# Patient Record
Sex: MALE | Race: WHITE | Employment: UNEMPLOYED | URBAN - METROPOLITAN AREA
[De-identification: names, ages, dates, MRNs, and addresses within clinical notes are randomized per-mention and may not be internally consistent; named-entity substitution may affect disease eponyms.]

---

## 2017-05-13 ENCOUNTER — HOSPITAL ENCOUNTER (OUTPATIENT)
Age: 27
Setting detail: OBSERVATION
Discharge: HOME | DRG: 383 | End: 2017-05-13
Attending: EMERGENCY MEDICINE | Admitting: SURGERY
Payer: MEDICAID

## 2017-05-13 VITALS
RESPIRATION RATE: 16 BRPM | SYSTOLIC BLOOD PRESSURE: 132 MMHG | WEIGHT: 236.5 LBS | TEMPERATURE: 97.2 F | HEIGHT: 69 IN | OXYGEN SATURATION: 99 % | DIASTOLIC BLOOD PRESSURE: 77 MMHG | BODY MASS INDEX: 35.03 KG/M2 | HEART RATE: 60 BPM
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
## 2017-05-13 DIAGNOSIS — T23.271A: Primary | ICD-10-CM

## 2017-05-13 DIAGNOSIS — T30.0 BURN BY HOT LIQUID: ICD-10-CM

## 2017-05-13 LAB
ABSOLUTE EOS #: 0.2 K/UL (ref 0–0.4)
ABSOLUTE LYMPH #: 2.5 K/UL (ref 1–4.8)
ABSOLUTE MONO #: 0.6 K/UL (ref 0.1–1.2)
ANION GAP SERPL CALCULATED.3IONS-SCNC: 14 MMOL/L (ref 9–17)
BASOPHILS # BLD: 1 %
BASOPHILS ABSOLUTE: 0 K/UL (ref 0–0.2)
BUN BLDV-MCNC: 8 MG/DL (ref 6–20)
BUN/CREAT BLD: ABNORMAL (ref 9–20)
CALCIUM SERPL-MCNC: 8.3 MG/DL (ref 8.6–10.4)
CHLORIDE BLD-SCNC: 103 MMOL/L (ref 98–107)
CO2: 21 MMOL/L (ref 20–31)
CREAT SERPL-MCNC: 0.77 MG/DL (ref 0.7–1.2)
DIFFERENTIAL TYPE: ABNORMAL
EOSINOPHILS RELATIVE PERCENT: 2 %
GFR AFRICAN AMERICAN: >60 ML/MIN
GFR NON-AFRICAN AMERICAN: >60 ML/MIN
GFR SERPL CREATININE-BSD FRML MDRD: ABNORMAL ML/MIN/{1.73_M2}
GFR SERPL CREATININE-BSD FRML MDRD: ABNORMAL ML/MIN/{1.73_M2}
GLUCOSE BLD-MCNC: 122 MG/DL (ref 70–99)
HCT VFR BLD CALC: 41.7 % (ref 41–53)
HEMOGLOBIN: 14.4 G/DL (ref 13.5–17.5)
LYMPHOCYTES # BLD: 30 %
MCH RBC QN AUTO: 31.3 PG (ref 26–34)
MCHC RBC AUTO-ENTMCNC: 34.5 G/DL (ref 31–37)
MCV RBC AUTO: 90.6 FL (ref 80–100)
MONOCYTES # BLD: 8 %
PDW BLD-RTO: 13.3 % (ref 12.5–15.4)
PLATELET # BLD: 108 K/UL (ref 140–450)
PLATELET ESTIMATE: ABNORMAL
PMV BLD AUTO: 11.7 FL (ref 6–12)
POTASSIUM SERPL-SCNC: 3.9 MMOL/L (ref 3.7–5.3)
RBC # BLD: 4.6 M/UL (ref 4.5–5.9)
RBC # BLD: ABNORMAL 10*6/UL
SEG NEUTROPHILS: 59 %
SEGMENTED NEUTROPHILS ABSOLUTE COUNT: 4.9 K/UL (ref 1.8–7.7)
SODIUM BLD-SCNC: 138 MMOL/L (ref 135–144)
WBC # BLD: 8.2 K/UL (ref 3.5–11)
WBC # BLD: ABNORMAL 10*3/UL

## 2017-05-13 PROCEDURE — 2500000003 HC RX 250 WO HCPCS

## 2017-05-13 PROCEDURE — 6370000000 HC RX 637 (ALT 250 FOR IP): Performed by: STUDENT IN AN ORGANIZED HEALTH CARE EDUCATION/TRAINING PROGRAM

## 2017-05-13 PROCEDURE — 99284 EMERGENCY DEPT VISIT MOD MDM: CPT

## 2017-05-13 PROCEDURE — 96376 TX/PRO/DX INJ SAME DRUG ADON: CPT

## 2017-05-13 PROCEDURE — 97110 THERAPEUTIC EXERCISES: CPT

## 2017-05-13 PROCEDURE — 96374 THER/PROPH/DIAG INJ IV PUSH: CPT

## 2017-05-13 PROCEDURE — 36415 COLL VENOUS BLD VENIPUNCTURE: CPT

## 2017-05-13 PROCEDURE — 2500000003 HC RX 250 WO HCPCS: Performed by: STUDENT IN AN ORGANIZED HEALTH CARE EDUCATION/TRAINING PROGRAM

## 2017-05-13 PROCEDURE — 97535 SELF CARE MNGMENT TRAINING: CPT

## 2017-05-13 PROCEDURE — 85025 COMPLETE CBC W/AUTO DIFF WBC: CPT

## 2017-05-13 PROCEDURE — 6360000002 HC RX W HCPCS

## 2017-05-13 PROCEDURE — G8987 SELF CARE CURRENT STATUS: HCPCS

## 2017-05-13 PROCEDURE — G0378 HOSPITAL OBSERVATION PER HR: HCPCS

## 2017-05-13 PROCEDURE — G8989 SELF CARE D/C STATUS: HCPCS

## 2017-05-13 PROCEDURE — 80048 BASIC METABOLIC PNL TOTAL CA: CPT

## 2017-05-13 PROCEDURE — 6360000002 HC RX W HCPCS: Performed by: STUDENT IN AN ORGANIZED HEALTH CARE EDUCATION/TRAINING PROGRAM

## 2017-05-13 PROCEDURE — 96375 TX/PRO/DX INJ NEW DRUG ADDON: CPT

## 2017-05-13 PROCEDURE — G8988 SELF CARE GOAL STATUS: HCPCS

## 2017-05-13 PROCEDURE — 97166 OT EVAL MOD COMPLEX 45 MIN: CPT

## 2017-05-13 PROCEDURE — S0028 INJECTION, FAMOTIDINE, 20 MG: HCPCS | Performed by: STUDENT IN AN ORGANIZED HEALTH CARE EDUCATION/TRAINING PROGRAM

## 2017-05-13 RX ORDER — OXYCODONE HYDROCHLORIDE 5 MG/1
5 TABLET ORAL EVERY 4 HOURS PRN
Qty: 40 TABLET | Refills: 0 | Status: ON HOLD | OUTPATIENT
Start: 2017-05-13 | End: 2017-05-17

## 2017-05-13 RX ORDER — FLUOXETINE HYDROCHLORIDE 20 MG/1
20 CAPSULE ORAL DAILY
Status: DISCONTINUED | OUTPATIENT
Start: 2017-05-13 | End: 2017-05-13

## 2017-05-13 RX ORDER — SODIUM CHLORIDE 0.9 % (FLUSH) 0.9 %
10 SYRINGE (ML) INJECTION EVERY 12 HOURS SCHEDULED
Status: DISCONTINUED | OUTPATIENT
Start: 2017-05-13 | End: 2017-05-13 | Stop reason: HOSPADM

## 2017-05-13 RX ORDER — OXYCODONE HYDROCHLORIDE 5 MG/1
10 TABLET ORAL EVERY 4 HOURS PRN
Status: DISCONTINUED | OUTPATIENT
Start: 2017-05-13 | End: 2017-05-13 | Stop reason: HOSPADM

## 2017-05-13 RX ORDER — ONDANSETRON 2 MG/ML
4 INJECTION INTRAMUSCULAR; INTRAVENOUS EVERY 6 HOURS PRN
Status: DISCONTINUED | OUTPATIENT
Start: 2017-05-13 | End: 2017-05-13 | Stop reason: HOSPADM

## 2017-05-13 RX ORDER — FENTANYL CITRATE 50 UG/ML
INJECTION, SOLUTION INTRAMUSCULAR; INTRAVENOUS
Status: COMPLETED
Start: 2017-05-13 | End: 2017-05-13

## 2017-05-13 RX ORDER — FENTANYL CITRATE 50 UG/ML
50 INJECTION, SOLUTION INTRAMUSCULAR; INTRAVENOUS
Status: DISCONTINUED | OUTPATIENT
Start: 2017-05-13 | End: 2017-05-13 | Stop reason: HOSPADM

## 2017-05-13 RX ORDER — FENTANYL CITRATE 50 UG/ML
50 INJECTION, SOLUTION INTRAMUSCULAR; INTRAVENOUS ONCE
Status: DISCONTINUED | OUTPATIENT
Start: 2017-05-13 | End: 2017-05-13

## 2017-05-13 RX ORDER — SODIUM CHLORIDE 0.9 % (FLUSH) 0.9 %
10 SYRINGE (ML) INJECTION PRN
Status: DISCONTINUED | OUTPATIENT
Start: 2017-05-13 | End: 2017-05-13 | Stop reason: HOSPADM

## 2017-05-13 RX ORDER — OXYCODONE HYDROCHLORIDE 5 MG/1
5 TABLET ORAL EVERY 4 HOURS PRN
Status: DISCONTINUED | OUTPATIENT
Start: 2017-05-13 | End: 2017-05-13

## 2017-05-13 RX ADMIN — FENTANYL CITRATE 50 MCG: 50 INJECTION, SOLUTION INTRAMUSCULAR; INTRAVENOUS at 02:35

## 2017-05-13 RX ADMIN — FENTANYL CITRATE 50 MCG: 50 INJECTION, SOLUTION INTRAMUSCULAR; INTRAVENOUS at 03:04

## 2017-05-13 RX ADMIN — HYDROMORPHONE HYDROCHLORIDE 1 MG: 1 INJECTION, SOLUTION INTRAMUSCULAR; INTRAVENOUS; SUBCUTANEOUS at 05:49

## 2017-05-13 RX ADMIN — SILVER SULFADIAZINE: 10 CREAM TOPICAL at 02:30

## 2017-05-13 RX ADMIN — SILVER SULFADIAZINE: 10 CREAM TOPICAL at 12:26

## 2017-05-13 RX ADMIN — FAMOTIDINE 20 MG: 10 INJECTION, SOLUTION INTRAVENOUS at 09:07

## 2017-05-13 RX ADMIN — Medication 1 MG: at 01:31

## 2017-05-13 RX ADMIN — HYDROMORPHONE HYDROCHLORIDE 1 MG: 1 INJECTION, SOLUTION INTRAMUSCULAR; INTRAVENOUS; SUBCUTANEOUS at 01:31

## 2017-05-13 RX ADMIN — OXYCODONE HYDROCHLORIDE 5 MG: 5 TABLET ORAL at 03:30

## 2017-05-13 RX ADMIN — HYDROMORPHONE HYDROCHLORIDE 1 MG: 1 INJECTION, SOLUTION INTRAMUSCULAR; INTRAVENOUS; SUBCUTANEOUS at 09:59

## 2017-05-13 RX ADMIN — FAMOTIDINE 20 MG: 10 INJECTION, SOLUTION INTRAVENOUS at 02:58

## 2017-05-13 RX ADMIN — OXYCODONE HYDROCHLORIDE 5 MG: 5 TABLET ORAL at 12:25

## 2017-05-13 ASSESSMENT — PAIN DESCRIPTION - PAIN TYPE
TYPE: ACUTE PAIN

## 2017-05-13 ASSESSMENT — PAIN DESCRIPTION - ORIENTATION
ORIENTATION: RIGHT

## 2017-05-13 ASSESSMENT — PAIN DESCRIPTION - LOCATION
LOCATION: HAND

## 2017-05-13 ASSESSMENT — PAIN SCALES - GENERAL
PAINLEVEL_OUTOF10: 6
PAINLEVEL_OUTOF10: 8
PAINLEVEL_OUTOF10: 8
PAINLEVEL_OUTOF10: 6
PAINLEVEL_OUTOF10: 1
PAINLEVEL_OUTOF10: 6
PAINLEVEL_OUTOF10: 7
PAINLEVEL_OUTOF10: 6
PAINLEVEL_OUTOF10: 8
PAINLEVEL_OUTOF10: 6
PAINLEVEL_OUTOF10: 8
PAINLEVEL_OUTOF10: 6

## 2017-05-13 ASSESSMENT — PAIN DESCRIPTION - PROGRESSION
CLINICAL_PROGRESSION: GRADUALLY WORSENING
CLINICAL_PROGRESSION: NOT CHANGED
CLINICAL_PROGRESSION: NOT CHANGED

## 2017-05-13 ASSESSMENT — PAIN DESCRIPTION - FREQUENCY
FREQUENCY: CONTINUOUS

## 2017-05-13 ASSESSMENT — PAIN DESCRIPTION - DESCRIPTORS
DESCRIPTORS: BURNING;CONSTANT
DESCRIPTORS: BURNING;DISCOMFORT;THROBBING
DESCRIPTORS: CONSTANT
DESCRIPTORS: BURNING;DISCOMFORT

## 2017-05-13 ASSESSMENT — ENCOUNTER SYMPTOMS
VOMITING: 0
COLOR CHANGE: 1

## 2017-05-13 ASSESSMENT — PAIN DESCRIPTION - ONSET
ONSET: ON-GOING
ONSET: ON-GOING
ONSET: SUDDEN
ONSET: PROGRESSIVE

## 2017-05-14 ENCOUNTER — APPOINTMENT (OUTPATIENT)
Dept: GENERAL RADIOLOGY | Age: 27
DRG: 383 | End: 2017-05-14
Payer: MEDICAID

## 2017-05-14 ENCOUNTER — HOSPITAL ENCOUNTER (INPATIENT)
Age: 27
LOS: 3 days | Discharge: HOME | DRG: 383 | End: 2017-05-17
Attending: EMERGENCY MEDICINE | Admitting: SURGERY
Payer: MEDICAID

## 2017-05-14 DIAGNOSIS — T23.201D: Primary | ICD-10-CM

## 2017-05-14 DIAGNOSIS — L03.113 CELLULITIS OF RIGHT UPPER EXTREMITY: ICD-10-CM

## 2017-05-14 PROBLEM — T30.0 BURN: Status: ACTIVE | Noted: 2017-05-14

## 2017-05-14 LAB
-: NORMAL
ABSOLUTE EOS #: 0.2 K/UL (ref 0–0.4)
ABSOLUTE LYMPH #: 1.5 K/UL (ref 1–4.8)
ABSOLUTE MONO #: 1.1 K/UL (ref 0.1–1.2)
ALBUMIN SERPL-MCNC: 4.5 G/DL (ref 3.5–5.2)
ALBUMIN/GLOBULIN RATIO: 1.5 (ref 1–2.5)
ALP BLD-CCNC: 95 U/L (ref 40–129)
ALT SERPL-CCNC: 31 U/L (ref 5–41)
AMORPHOUS: NORMAL
ANION GAP SERPL CALCULATED.3IONS-SCNC: 17 MMOL/L (ref 9–17)
AST SERPL-CCNC: 18 U/L
BACTERIA: NORMAL
BASOPHILS # BLD: 0 %
BASOPHILS ABSOLUTE: 0 K/UL (ref 0–0.2)
BILIRUB SERPL-MCNC: 0.85 MG/DL (ref 0.3–1.2)
BILIRUBIN DIRECT: 0.24 MG/DL
BILIRUBIN URINE: NEGATIVE
BILIRUBIN, INDIRECT: 0.61 MG/DL (ref 0–1)
BUN BLDV-MCNC: 4 MG/DL (ref 6–20)
BUN/CREAT BLD: ABNORMAL (ref 9–20)
CALCIUM SERPL-MCNC: 9.5 MG/DL (ref 8.6–10.4)
CASTS UA: NORMAL /LPF (ref 0–2)
CHLORIDE BLD-SCNC: 99 MMOL/L (ref 98–107)
CO2: 21 MMOL/L (ref 20–31)
COLOR: YELLOW
COMMENT UA: ABNORMAL
CREAT SERPL-MCNC: 0.77 MG/DL (ref 0.7–1.2)
CRYSTALS, UA: NORMAL /HPF
DIFFERENTIAL TYPE: ABNORMAL
EOSINOPHILS RELATIVE PERCENT: 1 %
EPITHELIAL CELLS UA: NORMAL /HPF (ref 0–5)
GFR AFRICAN AMERICAN: >60 ML/MIN
GFR NON-AFRICAN AMERICAN: >60 ML/MIN
GFR SERPL CREATININE-BSD FRML MDRD: ABNORMAL ML/MIN/{1.73_M2}
GFR SERPL CREATININE-BSD FRML MDRD: ABNORMAL ML/MIN/{1.73_M2}
GLOBULIN: NORMAL G/DL (ref 1.5–3.8)
GLUCOSE BLD-MCNC: 110 MG/DL (ref 70–99)
GLUCOSE URINE: NEGATIVE
HCT VFR BLD CALC: 46.7 % (ref 41–53)
HEMOGLOBIN: 16.2 G/DL (ref 13.5–17.5)
INR BLD: 1.1
KETONES, URINE: NEGATIVE
LACTIC ACID, WHOLE BLOOD: 1.2 MMOL/L (ref 0.7–2.1)
LEUKOCYTE ESTERASE, URINE: NEGATIVE
LYMPHOCYTES # BLD: 11 %
MCH RBC QN AUTO: 31.3 PG (ref 26–34)
MCHC RBC AUTO-ENTMCNC: 34.6 G/DL (ref 31–37)
MCV RBC AUTO: 90.5 FL (ref 80–100)
MONOCYTES # BLD: 8 %
MUCUS: NORMAL
NITRITE, URINE: NEGATIVE
OTHER OBSERVATIONS UA: NORMAL
PARTIAL THROMBOPLASTIN TIME: 26.3 SEC (ref 21.3–31.3)
PDW BLD-RTO: 13.6 % (ref 12.5–15.4)
PH UA: 6 (ref 5–8)
PLATELET # BLD: 120 K/UL (ref 140–450)
PLATELET ESTIMATE: ABNORMAL
PMV BLD AUTO: 11.1 FL (ref 6–12)
POTASSIUM SERPL-SCNC: 3.7 MMOL/L (ref 3.7–5.3)
PROTEIN UA: NEGATIVE
PROTHROMBIN TIME: 11.4 SEC (ref 9.4–12.6)
RBC # BLD: 5.17 M/UL (ref 4.5–5.9)
RBC # BLD: ABNORMAL 10*6/UL
RBC UA: NORMAL /HPF (ref 0–2)
RENAL EPITHELIAL, UA: NORMAL /HPF
SEG NEUTROPHILS: 80 %
SEGMENTED NEUTROPHILS ABSOLUTE COUNT: 10.7 K/UL (ref 1.8–7.7)
SODIUM BLD-SCNC: 137 MMOL/L (ref 135–144)
SPECIFIC GRAVITY UA: 1 (ref 1–1.03)
TOTAL PROTEIN: 7.6 G/DL (ref 6.4–8.3)
TRICHOMONAS: NORMAL
TURBIDITY: CLEAR
URINE HGB: ABNORMAL
UROBILINOGEN, URINE: NORMAL
WBC # BLD: 13.6 K/UL (ref 3.5–11)
WBC # BLD: ABNORMAL 10*3/UL
WBC UA: NORMAL /HPF (ref 0–5)
YEAST: NORMAL

## 2017-05-14 PROCEDURE — 85730 THROMBOPLASTIN TIME PARTIAL: CPT

## 2017-05-14 PROCEDURE — 71020 XR CHEST STANDARD TWO VW: CPT

## 2017-05-14 PROCEDURE — 6360000002 HC RX W HCPCS: Performed by: EMERGENCY MEDICINE

## 2017-05-14 PROCEDURE — 87040 BLOOD CULTURE FOR BACTERIA: CPT

## 2017-05-14 PROCEDURE — 6370000000 HC RX 637 (ALT 250 FOR IP): Performed by: STUDENT IN AN ORGANIZED HEALTH CARE EDUCATION/TRAINING PROGRAM

## 2017-05-14 PROCEDURE — 99284 EMERGENCY DEPT VISIT MOD MDM: CPT

## 2017-05-14 PROCEDURE — 85025 COMPLETE CBC W/AUTO DIFF WBC: CPT

## 2017-05-14 PROCEDURE — 80076 HEPATIC FUNCTION PANEL: CPT

## 2017-05-14 PROCEDURE — 80048 BASIC METABOLIC PNL TOTAL CA: CPT

## 2017-05-14 PROCEDURE — 2580000003 HC RX 258: Performed by: EMERGENCY MEDICINE

## 2017-05-14 PROCEDURE — 16020 DRESS/DEBRID P-THICK BURN S: CPT

## 2017-05-14 PROCEDURE — 2500000003 HC RX 250 WO HCPCS: Performed by: PLASTIC SURGERY

## 2017-05-14 PROCEDURE — 83605 ASSAY OF LACTIC ACID: CPT

## 2017-05-14 PROCEDURE — 81001 URINALYSIS AUTO W/SCOPE: CPT

## 2017-05-14 PROCEDURE — 6360000002 HC RX W HCPCS

## 2017-05-14 PROCEDURE — 85610 PROTHROMBIN TIME: CPT

## 2017-05-14 PROCEDURE — 6360000002 HC RX W HCPCS: Performed by: STUDENT IN AN ORGANIZED HEALTH CARE EDUCATION/TRAINING PROGRAM

## 2017-05-14 PROCEDURE — 1200000000 HC SEMI PRIVATE

## 2017-05-14 RX ORDER — FAMOTIDINE 20 MG/1
20 TABLET, FILM COATED ORAL 2 TIMES DAILY
Status: DISCONTINUED | OUTPATIENT
Start: 2017-05-14 | End: 2017-05-17 | Stop reason: HOSPADM

## 2017-05-14 RX ORDER — ONDANSETRON 2 MG/ML
4 INJECTION INTRAMUSCULAR; INTRAVENOUS EVERY 6 HOURS PRN
Status: DISCONTINUED | OUTPATIENT
Start: 2017-05-14 | End: 2017-05-17 | Stop reason: HOSPADM

## 2017-05-14 RX ORDER — OXYCODONE HYDROCHLORIDE 5 MG/1
5 TABLET ORAL EVERY 4 HOURS PRN
Status: DISCONTINUED | OUTPATIENT
Start: 2017-05-14 | End: 2017-05-15

## 2017-05-14 RX ORDER — PROMETHAZINE HYDROCHLORIDE 25 MG/ML
12.5 INJECTION, SOLUTION INTRAMUSCULAR; INTRAVENOUS ONCE
Status: COMPLETED | OUTPATIENT
Start: 2017-05-14 | End: 2017-05-14

## 2017-05-14 RX ORDER — SODIUM CHLORIDE 0.9 % (FLUSH) 0.9 %
10 SYRINGE (ML) INJECTION PRN
Status: DISCONTINUED | OUTPATIENT
Start: 2017-05-14 | End: 2017-05-17 | Stop reason: HOSPADM

## 2017-05-14 RX ORDER — SODIUM CHLORIDE 0.9 % (FLUSH) 0.9 %
10 SYRINGE (ML) INJECTION EVERY 12 HOURS SCHEDULED
Status: DISCONTINUED | OUTPATIENT
Start: 2017-05-14 | End: 2017-05-17 | Stop reason: HOSPADM

## 2017-05-14 RX ORDER — PROMETHAZINE HYDROCHLORIDE 25 MG/ML
INJECTION, SOLUTION INTRAMUSCULAR; INTRAVENOUS
Status: COMPLETED
Start: 2017-05-14 | End: 2017-05-14

## 2017-05-14 RX ADMIN — SILVER SULFADIAZINE: 10 CREAM TOPICAL at 22:30

## 2017-05-14 RX ADMIN — DEXTROSE MONOHYDRATE 2 G: 50 INJECTION, SOLUTION INTRAVENOUS at 21:27

## 2017-05-14 RX ADMIN — FAMOTIDINE 20 MG: 20 TABLET, FILM COATED ORAL at 22:15

## 2017-05-14 RX ADMIN — PROMETHAZINE HYDROCHLORIDE 12.5 MG: 25 INJECTION INTRAMUSCULAR; INTRAVENOUS at 20:18

## 2017-05-14 RX ADMIN — HYDROMORPHONE HYDROCHLORIDE 1 MG: 1 INJECTION, SOLUTION INTRAMUSCULAR; INTRAVENOUS; SUBCUTANEOUS at 23:07

## 2017-05-14 RX ADMIN — PROMETHAZINE HYDROCHLORIDE 12.5 MG: 25 INJECTION, SOLUTION INTRAMUSCULAR; INTRAVENOUS at 20:18

## 2017-05-14 RX ADMIN — OXYCODONE HYDROCHLORIDE 5 MG: 5 TABLET ORAL at 21:44

## 2017-05-14 RX ADMIN — HYDROMORPHONE HYDROCHLORIDE 2 MG: 1 INJECTION, SOLUTION INTRAMUSCULAR; INTRAVENOUS; SUBCUTANEOUS at 21:02

## 2017-05-14 RX ADMIN — HYDROMORPHONE HYDROCHLORIDE 1 MG: 1 INJECTION, SOLUTION INTRAMUSCULAR; INTRAVENOUS; SUBCUTANEOUS at 20:18

## 2017-05-14 ASSESSMENT — PAIN DESCRIPTION - PAIN TYPE
TYPE: ACUTE PAIN

## 2017-05-14 ASSESSMENT — PAIN DESCRIPTION - DESCRIPTORS
DESCRIPTORS: ACHING;BURNING;PRESSURE
DESCRIPTORS: BURNING;CONSTANT

## 2017-05-14 ASSESSMENT — PAIN DESCRIPTION - FREQUENCY
FREQUENCY: CONTINUOUS

## 2017-05-14 ASSESSMENT — PAIN DESCRIPTION - ORIENTATION
ORIENTATION: RIGHT

## 2017-05-14 ASSESSMENT — PAIN SCALES - GENERAL
PAINLEVEL_OUTOF10: 7
PAINLEVEL_OUTOF10: 3
PAINLEVEL_OUTOF10: 10
PAINLEVEL_OUTOF10: 7
PAINLEVEL_OUTOF10: 8
PAINLEVEL_OUTOF10: 10

## 2017-05-14 ASSESSMENT — PAIN DESCRIPTION - LOCATION
LOCATION: HAND
LOCATION: ARM;HAND

## 2017-05-14 ASSESSMENT — ENCOUNTER SYMPTOMS
ABDOMINAL PAIN: 0
EYE DISCHARGE: 0
SHORTNESS OF BREATH: 0
SORE THROAT: 0
RHINORRHEA: 0
NAUSEA: 0
VOMITING: 0

## 2017-05-14 ASSESSMENT — PAIN DESCRIPTION - ONSET: ONSET: ON-GOING

## 2017-05-15 LAB
ABSOLUTE EOS #: 0.2 K/UL (ref 0–0.4)
ABSOLUTE LYMPH #: 2.3 K/UL (ref 1–4.8)
ABSOLUTE MONO #: 1.1 K/UL (ref 0.1–1.2)
ANION GAP SERPL CALCULATED.3IONS-SCNC: 14 MMOL/L (ref 9–17)
BASOPHILS # BLD: 0 %
BASOPHILS ABSOLUTE: 0 K/UL (ref 0–0.2)
BUN BLDV-MCNC: 5 MG/DL (ref 6–20)
BUN/CREAT BLD: ABNORMAL (ref 9–20)
CALCIUM SERPL-MCNC: 8.6 MG/DL (ref 8.6–10.4)
CHLORIDE BLD-SCNC: 103 MMOL/L (ref 98–107)
CO2: 24 MMOL/L (ref 20–31)
CREAT SERPL-MCNC: 0.82 MG/DL (ref 0.7–1.2)
DIFFERENTIAL TYPE: ABNORMAL
EOSINOPHILS RELATIVE PERCENT: 2 %
GFR AFRICAN AMERICAN: >60 ML/MIN
GFR NON-AFRICAN AMERICAN: >60 ML/MIN
GFR SERPL CREATININE-BSD FRML MDRD: ABNORMAL ML/MIN/{1.73_M2}
GFR SERPL CREATININE-BSD FRML MDRD: ABNORMAL ML/MIN/{1.73_M2}
GLUCOSE BLD-MCNC: 103 MG/DL (ref 70–99)
HCT VFR BLD CALC: 42.1 % (ref 41–53)
HEMOGLOBIN: 14.7 G/DL (ref 13.5–17.5)
LACTIC ACID, WHOLE BLOOD: 0.8 MMOL/L (ref 0.7–2.1)
LACTIC ACID: NORMAL MMOL/L
LYMPHOCYTES # BLD: 24 %
MCH RBC QN AUTO: 31.5 PG (ref 26–34)
MCHC RBC AUTO-ENTMCNC: 35 G/DL (ref 31–37)
MCV RBC AUTO: 90 FL (ref 80–100)
MONOCYTES # BLD: 12 %
PDW BLD-RTO: 13.4 % (ref 12.5–15.4)
PLATELET # BLD: 104 K/UL (ref 140–450)
PLATELET ESTIMATE: ABNORMAL
PMV BLD AUTO: 11.4 FL (ref 6–12)
POTASSIUM SERPL-SCNC: 3.8 MMOL/L (ref 3.7–5.3)
RBC # BLD: 4.68 M/UL (ref 4.5–5.9)
RBC # BLD: ABNORMAL 10*6/UL
SEG NEUTROPHILS: 62 %
SEGMENTED NEUTROPHILS ABSOLUTE COUNT: 6 K/UL (ref 1.8–7.7)
SODIUM BLD-SCNC: 141 MMOL/L (ref 135–144)
WBC # BLD: 9.6 K/UL (ref 3.5–11)
WBC # BLD: ABNORMAL 10*3/UL

## 2017-05-15 PROCEDURE — 36415 COLL VENOUS BLD VENIPUNCTURE: CPT

## 2017-05-15 PROCEDURE — 97140 MANUAL THERAPY 1/> REGIONS: CPT

## 2017-05-15 PROCEDURE — 6360000002 HC RX W HCPCS: Performed by: STUDENT IN AN ORGANIZED HEALTH CARE EDUCATION/TRAINING PROGRAM

## 2017-05-15 PROCEDURE — 80048 BASIC METABOLIC PNL TOTAL CA: CPT

## 2017-05-15 PROCEDURE — 16020 DRESS/DEBRID P-THICK BURN S: CPT

## 2017-05-15 PROCEDURE — 6370000000 HC RX 637 (ALT 250 FOR IP): Performed by: EMERGENCY MEDICINE

## 2017-05-15 PROCEDURE — 2580000003 HC RX 258: Performed by: STUDENT IN AN ORGANIZED HEALTH CARE EDUCATION/TRAINING PROGRAM

## 2017-05-15 PROCEDURE — 0HDDXZZ EXTRACTION OF RIGHT LOWER ARM SKIN, EXTERNAL APPROACH: ICD-10-PCS | Performed by: SURGERY

## 2017-05-15 PROCEDURE — 6370000000 HC RX 637 (ALT 250 FOR IP): Performed by: STUDENT IN AN ORGANIZED HEALTH CARE EDUCATION/TRAINING PROGRAM

## 2017-05-15 PROCEDURE — 83605 ASSAY OF LACTIC ACID: CPT

## 2017-05-15 PROCEDURE — 97165 OT EVAL LOW COMPLEX 30 MIN: CPT

## 2017-05-15 PROCEDURE — 0HDFXZZ EXTRACTION OF RIGHT HAND SKIN, EXTERNAL APPROACH: ICD-10-PCS | Performed by: SURGERY

## 2017-05-15 PROCEDURE — 1200000000 HC SEMI PRIVATE

## 2017-05-15 PROCEDURE — 2580000003 HC RX 258: Performed by: PLASTIC SURGERY

## 2017-05-15 PROCEDURE — 6360000002 HC RX W HCPCS

## 2017-05-15 PROCEDURE — 6360000002 HC RX W HCPCS: Performed by: PLASTIC SURGERY

## 2017-05-15 PROCEDURE — 85025 COMPLETE CBC W/AUTO DIFF WBC: CPT

## 2017-05-15 RX ORDER — SENNA PLUS 8.6 MG/1
1 TABLET ORAL NIGHTLY
Status: DISCONTINUED | OUTPATIENT
Start: 2017-05-15 | End: 2017-05-17 | Stop reason: HOSPADM

## 2017-05-15 RX ORDER — OXYCODONE HYDROCHLORIDE 5 MG/1
10 TABLET ORAL EVERY 4 HOURS PRN
Status: DISCONTINUED | OUTPATIENT
Start: 2017-05-15 | End: 2017-05-15

## 2017-05-15 RX ORDER — DOCUSATE SODIUM 100 MG/1
100 CAPSULE, LIQUID FILLED ORAL 2 TIMES DAILY
Status: DISCONTINUED | OUTPATIENT
Start: 2017-05-15 | End: 2017-05-17 | Stop reason: HOSPADM

## 2017-05-15 RX ORDER — POLYETHYLENE GLYCOL 3350 17 G/17G
17 POWDER, FOR SOLUTION ORAL DAILY
Status: DISCONTINUED | OUTPATIENT
Start: 2017-05-16 | End: 2017-05-17 | Stop reason: HOSPADM

## 2017-05-15 RX ORDER — OXYCODONE HYDROCHLORIDE 5 MG/1
5 TABLET ORAL EVERY 4 HOURS PRN
Status: DISCONTINUED | OUTPATIENT
Start: 2017-05-15 | End: 2017-05-17 | Stop reason: HOSPADM

## 2017-05-15 RX ORDER — FENTANYL CITRATE 50 UG/ML
50 INJECTION, SOLUTION INTRAMUSCULAR; INTRAVENOUS ONCE
Status: COMPLETED | OUTPATIENT
Start: 2017-05-15 | End: 2017-05-15

## 2017-05-15 RX ORDER — FENTANYL CITRATE 50 UG/ML
100 INJECTION, SOLUTION INTRAMUSCULAR; INTRAVENOUS EVERY 30 MIN PRN
Status: DISCONTINUED | OUTPATIENT
Start: 2017-05-15 | End: 2017-05-15

## 2017-05-15 RX ORDER — OXYCODONE HYDROCHLORIDE 5 MG/1
15 TABLET ORAL EVERY 4 HOURS PRN
Status: DISCONTINUED | OUTPATIENT
Start: 2017-05-15 | End: 2017-05-17 | Stop reason: HOSPADM

## 2017-05-15 RX ORDER — FENTANYL CITRATE 50 UG/ML
50 INJECTION, SOLUTION INTRAMUSCULAR; INTRAVENOUS EVERY 30 MIN PRN
Status: DISCONTINUED | OUTPATIENT
Start: 2017-05-15 | End: 2017-05-15

## 2017-05-15 RX ORDER — ACETAMINOPHEN 325 MG/1
650 TABLET ORAL EVERY 4 HOURS PRN
Status: DISCONTINUED | OUTPATIENT
Start: 2017-05-14 | End: 2017-05-17 | Stop reason: HOSPADM

## 2017-05-15 RX ORDER — OXYCODONE HYDROCHLORIDE 5 MG/1
10 TABLET ORAL EVERY 4 HOURS PRN
Status: DISCONTINUED | OUTPATIENT
Start: 2017-05-15 | End: 2017-05-17 | Stop reason: HOSPADM

## 2017-05-15 RX ORDER — FENTANYL CITRATE 50 UG/ML
INJECTION, SOLUTION INTRAMUSCULAR; INTRAVENOUS
Status: COMPLETED
Start: 2017-05-15 | End: 2017-05-15

## 2017-05-15 RX ADMIN — FENTANYL CITRATE 100 MCG: 50 INJECTION, SOLUTION INTRAMUSCULAR; INTRAVENOUS at 17:01

## 2017-05-15 RX ADMIN — OXYCODONE HYDROCHLORIDE 5 MG: 5 TABLET ORAL at 00:24

## 2017-05-15 RX ADMIN — OXYCODONE HYDROCHLORIDE 10 MG: 5 TABLET ORAL at 04:11

## 2017-05-15 RX ADMIN — OXYCODONE HYDROCHLORIDE 5 MG: 5 TABLET ORAL at 11:43

## 2017-05-15 RX ADMIN — Medication 10 ML: at 08:28

## 2017-05-15 RX ADMIN — SENNA 8.6 MG: 8.6 TABLET, COATED ORAL at 20:25

## 2017-05-15 RX ADMIN — Medication 10 ML: at 20:28

## 2017-05-15 RX ADMIN — FAMOTIDINE 20 MG: 20 TABLET, FILM COATED ORAL at 20:25

## 2017-05-15 RX ADMIN — AMPICILLIN SODIUM AND SULBACTAM SODIUM 3 G: 2; 1 INJECTION, POWDER, FOR SOLUTION INTRAMUSCULAR; INTRAVENOUS at 03:43

## 2017-05-15 RX ADMIN — AMPICILLIN SODIUM AND SULBACTAM SODIUM 3 G: 2; 1 INJECTION, POWDER, FOR SOLUTION INTRAMUSCULAR; INTRAVENOUS at 11:44

## 2017-05-15 RX ADMIN — FAMOTIDINE 20 MG: 20 TABLET, FILM COATED ORAL at 08:27

## 2017-05-15 RX ADMIN — ACETAMINOPHEN 650 MG: 325 TABLET ORAL at 00:24

## 2017-05-15 RX ADMIN — AMPICILLIN SODIUM AND SULBACTAM SODIUM 3 G: 2; 1 INJECTION, POWDER, FOR SOLUTION INTRAMUSCULAR; INTRAVENOUS at 18:52

## 2017-05-15 RX ADMIN — DOCUSATE SODIUM 100 MG: 100 CAPSULE ORAL at 20:25

## 2017-05-15 RX ADMIN — OXYCODONE HYDROCHLORIDE 15 MG: 5 TABLET ORAL at 20:24

## 2017-05-15 RX ADMIN — SILVER SULFADIAZINE: 10 CREAM TOPICAL at 08:28

## 2017-05-15 RX ADMIN — HYDROMORPHONE HYDROCHLORIDE 1 MG: 1 INJECTION, SOLUTION INTRAMUSCULAR; INTRAVENOUS; SUBCUTANEOUS at 05:50

## 2017-05-15 RX ADMIN — OXYCODONE HYDROCHLORIDE 15 MG: 5 TABLET ORAL at 16:21

## 2017-05-15 RX ADMIN — DOCUSATE SODIUM 100 MG: 100 CAPSULE ORAL at 08:27

## 2017-05-15 RX ADMIN — OXYCODONE HYDROCHLORIDE 10 MG: 5 TABLET ORAL at 08:27

## 2017-05-15 RX ADMIN — OXYCODONE HYDROCHLORIDE 10 MG: 5 TABLET ORAL at 12:34

## 2017-05-15 ASSESSMENT — PAIN DESCRIPTION - ONSET
ONSET: ON-GOING
ONSET: ON-GOING

## 2017-05-15 ASSESSMENT — PAIN SCALES - GENERAL
PAINLEVEL_OUTOF10: 10
PAINLEVEL_OUTOF10: 6
PAINLEVEL_OUTOF10: 7
PAINLEVEL_OUTOF10: 8
PAINLEVEL_OUTOF10: 9
PAINLEVEL_OUTOF10: 7
PAINLEVEL_OUTOF10: 7
PAINLEVEL_OUTOF10: 9
PAINLEVEL_OUTOF10: 10
PAINLEVEL_OUTOF10: 7

## 2017-05-15 ASSESSMENT — PAIN DESCRIPTION - LOCATION
LOCATION: HAND

## 2017-05-15 ASSESSMENT — PAIN DESCRIPTION - FREQUENCY
FREQUENCY: CONTINUOUS

## 2017-05-15 ASSESSMENT — PAIN DESCRIPTION - DESCRIPTORS
DESCRIPTORS: BURNING;CONSTANT
DESCRIPTORS: BURNING;DISCOMFORT
DESCRIPTORS: BURNING;CONSTANT

## 2017-05-15 ASSESSMENT — PAIN DESCRIPTION - ORIENTATION
ORIENTATION: RIGHT

## 2017-05-15 ASSESSMENT — PAIN DESCRIPTION - PAIN TYPE
TYPE: ACUTE PAIN

## 2017-05-15 ASSESSMENT — PAIN DESCRIPTION - PROGRESSION: CLINICAL_PROGRESSION: NOT CHANGED

## 2017-05-16 PROCEDURE — 6370000000 HC RX 637 (ALT 250 FOR IP): Performed by: EMERGENCY MEDICINE

## 2017-05-16 PROCEDURE — 6360000002 HC RX W HCPCS: Performed by: PLASTIC SURGERY

## 2017-05-16 PROCEDURE — 6370000000 HC RX 637 (ALT 250 FOR IP): Performed by: STUDENT IN AN ORGANIZED HEALTH CARE EDUCATION/TRAINING PROGRAM

## 2017-05-16 PROCEDURE — 2580000003 HC RX 258: Performed by: STUDENT IN AN ORGANIZED HEALTH CARE EDUCATION/TRAINING PROGRAM

## 2017-05-16 PROCEDURE — 2500000003 HC RX 250 WO HCPCS: Performed by: PLASTIC SURGERY

## 2017-05-16 PROCEDURE — 1200000000 HC SEMI PRIVATE

## 2017-05-16 PROCEDURE — 2580000003 HC RX 258: Performed by: PLASTIC SURGERY

## 2017-05-16 PROCEDURE — 99406 BEHAV CHNG SMOKING 3-10 MIN: CPT

## 2017-05-16 RX ADMIN — FAMOTIDINE 20 MG: 20 TABLET, FILM COATED ORAL at 20:00

## 2017-05-16 RX ADMIN — OXYCODONE HYDROCHLORIDE 15 MG: 5 TABLET ORAL at 00:37

## 2017-05-16 RX ADMIN — AMPICILLIN SODIUM AND SULBACTAM SODIUM 3 G: 2; 1 INJECTION, POWDER, FOR SOLUTION INTRAMUSCULAR; INTRAVENOUS at 00:14

## 2017-05-16 RX ADMIN — SILVER SULFADIAZINE: 10 CREAM TOPICAL at 11:58

## 2017-05-16 RX ADMIN — AMPICILLIN SODIUM AND SULBACTAM SODIUM 3 G: 2; 1 INJECTION, POWDER, FOR SOLUTION INTRAMUSCULAR; INTRAVENOUS at 05:49

## 2017-05-16 RX ADMIN — AMPICILLIN SODIUM AND SULBACTAM SODIUM 3 G: 2; 1 INJECTION, POWDER, FOR SOLUTION INTRAMUSCULAR; INTRAVENOUS at 11:50

## 2017-05-16 RX ADMIN — SILVER SULFADIAZINE: 10 CREAM TOPICAL at 21:15

## 2017-05-16 RX ADMIN — OXYCODONE HYDROCHLORIDE 15 MG: 5 TABLET ORAL at 04:59

## 2017-05-16 RX ADMIN — AMPICILLIN SODIUM AND SULBACTAM SODIUM 3 G: 2; 1 INJECTION, POWDER, FOR SOLUTION INTRAMUSCULAR; INTRAVENOUS at 19:13

## 2017-05-16 RX ADMIN — OXYCODONE HYDROCHLORIDE 15 MG: 5 TABLET ORAL at 09:06

## 2017-05-16 RX ADMIN — SENNA 8.6 MG: 8.6 TABLET, COATED ORAL at 19:59

## 2017-05-16 RX ADMIN — Medication 10 ML: at 11:55

## 2017-05-16 RX ADMIN — OXYCODONE HYDROCHLORIDE 15 MG: 5 TABLET ORAL at 13:11

## 2017-05-16 RX ADMIN — OXYCODONE HYDROCHLORIDE 15 MG: 5 TABLET ORAL at 17:05

## 2017-05-16 RX ADMIN — DOCUSATE SODIUM 100 MG: 100 CAPSULE ORAL at 19:59

## 2017-05-16 RX ADMIN — DOCUSATE SODIUM 100 MG: 100 CAPSULE ORAL at 11:49

## 2017-05-16 RX ADMIN — OXYCODONE HYDROCHLORIDE 15 MG: 5 TABLET ORAL at 20:39

## 2017-05-16 RX ADMIN — Medication 10 ML: at 19:59

## 2017-05-16 RX ADMIN — FAMOTIDINE 20 MG: 20 TABLET, FILM COATED ORAL at 11:49

## 2017-05-16 RX ADMIN — POLYETHYLENE GLYCOL 3350 17 G: 17 POWDER, FOR SOLUTION ORAL at 11:49

## 2017-05-16 ASSESSMENT — PAIN DESCRIPTION - ONSET: ONSET: ON-GOING

## 2017-05-16 ASSESSMENT — PAIN SCALES - GENERAL
PAINLEVEL_OUTOF10: 7
PAINLEVEL_OUTOF10: 8
PAINLEVEL_OUTOF10: 5
PAINLEVEL_OUTOF10: 8
PAINLEVEL_OUTOF10: 7
PAINLEVEL_OUTOF10: 8
PAINLEVEL_OUTOF10: 10
PAINLEVEL_OUTOF10: 4

## 2017-05-16 ASSESSMENT — PAIN DESCRIPTION - LOCATION: LOCATION: HAND

## 2017-05-16 ASSESSMENT — PAIN DESCRIPTION - DESCRIPTORS: DESCRIPTORS: BURNING;CONSTANT;DISCOMFORT

## 2017-05-16 ASSESSMENT — PAIN DESCRIPTION - PROGRESSION: CLINICAL_PROGRESSION: NOT CHANGED

## 2017-05-16 ASSESSMENT — PAIN DESCRIPTION - FREQUENCY: FREQUENCY: CONTINUOUS

## 2017-05-16 ASSESSMENT — PAIN DESCRIPTION - ORIENTATION: ORIENTATION: RIGHT

## 2017-05-16 ASSESSMENT — PAIN DESCRIPTION - PAIN TYPE: TYPE: ACUTE PAIN

## 2017-05-17 VITALS
RESPIRATION RATE: 14 BRPM | HEART RATE: 64 BPM | OXYGEN SATURATION: 98 % | TEMPERATURE: 98.5 F | HEIGHT: 68 IN | DIASTOLIC BLOOD PRESSURE: 87 MMHG | SYSTOLIC BLOOD PRESSURE: 126 MMHG | WEIGHT: 236 LBS | BODY MASS INDEX: 35.77 KG/M2

## 2017-05-17 PROCEDURE — 6360000002 HC RX W HCPCS: Performed by: PLASTIC SURGERY

## 2017-05-17 PROCEDURE — 6370000000 HC RX 637 (ALT 250 FOR IP): Performed by: STUDENT IN AN ORGANIZED HEALTH CARE EDUCATION/TRAINING PROGRAM

## 2017-05-17 PROCEDURE — 6370000000 HC RX 637 (ALT 250 FOR IP): Performed by: EMERGENCY MEDICINE

## 2017-05-17 PROCEDURE — 97140 MANUAL THERAPY 1/> REGIONS: CPT

## 2017-05-17 PROCEDURE — 2580000003 HC RX 258: Performed by: PLASTIC SURGERY

## 2017-05-17 PROCEDURE — 2580000003 HC RX 258: Performed by: STUDENT IN AN ORGANIZED HEALTH CARE EDUCATION/TRAINING PROGRAM

## 2017-05-17 RX ORDER — PSEUDOEPHEDRINE HCL 30 MG
100 TABLET ORAL 2 TIMES DAILY
Qty: 60 CAPSULE | Refills: 0 | Status: SHIPPED | OUTPATIENT
Start: 2017-05-17 | End: 2018-04-26 | Stop reason: ALTCHOICE

## 2017-05-17 RX ORDER — OXYCODONE HYDROCHLORIDE 5 MG/1
TABLET ORAL
Qty: 56 TABLET | Refills: 0 | Status: SHIPPED | OUTPATIENT
Start: 2017-05-17 | End: 2018-04-26 | Stop reason: ALTCHOICE

## 2017-05-17 RX ORDER — AMOXICILLIN AND CLAVULANATE POTASSIUM 875; 125 MG/1; MG/1
1 TABLET, FILM COATED ORAL EVERY 12 HOURS
Qty: 10 TABLET | Refills: 0 | Status: SHIPPED | OUTPATIENT
Start: 2017-05-17 | End: 2017-05-22

## 2017-05-17 RX ADMIN — Medication 10 ML: at 08:48

## 2017-05-17 RX ADMIN — DOCUSATE SODIUM 100 MG: 100 CAPSULE ORAL at 08:40

## 2017-05-17 RX ADMIN — SILVER SULFADIAZINE: 10 CREAM TOPICAL at 08:48

## 2017-05-17 RX ADMIN — OXYCODONE HYDROCHLORIDE 15 MG: 5 TABLET ORAL at 12:36

## 2017-05-17 RX ADMIN — POLYETHYLENE GLYCOL 3350 17 G: 17 POWDER, FOR SOLUTION ORAL at 08:40

## 2017-05-17 RX ADMIN — AMPICILLIN SODIUM AND SULBACTAM SODIUM 3 G: 2; 1 INJECTION, POWDER, FOR SOLUTION INTRAMUSCULAR; INTRAVENOUS at 00:25

## 2017-05-17 RX ADMIN — OXYCODONE HYDROCHLORIDE 15 MG: 5 TABLET ORAL at 04:33

## 2017-05-17 RX ADMIN — AMPICILLIN SODIUM AND SULBACTAM SODIUM 3 G: 2; 1 INJECTION, POWDER, FOR SOLUTION INTRAMUSCULAR; INTRAVENOUS at 05:49

## 2017-05-17 RX ADMIN — OXYCODONE HYDROCHLORIDE 15 MG: 5 TABLET ORAL at 08:40

## 2017-05-17 RX ADMIN — FAMOTIDINE 20 MG: 20 TABLET, FILM COATED ORAL at 08:40

## 2017-05-17 RX ADMIN — OXYCODONE HYDROCHLORIDE 15 MG: 5 TABLET ORAL at 00:25

## 2017-05-17 ASSESSMENT — PAIN DESCRIPTION - LOCATION
LOCATION: HAND
LOCATION: HAND

## 2017-05-17 ASSESSMENT — PAIN SCALES - GENERAL
PAINLEVEL_OUTOF10: 8
PAINLEVEL_OUTOF10: 8
PAINLEVEL_OUTOF10: 9
PAINLEVEL_OUTOF10: 4
PAINLEVEL_OUTOF10: 9
PAINLEVEL_OUTOF10: 7
PAINLEVEL_OUTOF10: 4
PAINLEVEL_OUTOF10: 9

## 2017-05-17 ASSESSMENT — PAIN DESCRIPTION - PROGRESSION: CLINICAL_PROGRESSION: NOT CHANGED

## 2017-05-17 ASSESSMENT — PAIN DESCRIPTION - ORIENTATION
ORIENTATION: RIGHT
ORIENTATION: RIGHT

## 2017-05-17 ASSESSMENT — PAIN DESCRIPTION - PAIN TYPE: TYPE: ACUTE PAIN

## 2017-05-20 LAB
CULTURE: NORMAL
Lab: NORMAL
Lab: NORMAL
SPECIMEN DESCRIPTION: NORMAL
SPECIMEN DESCRIPTION: NORMAL
STATUS: NORMAL
STATUS: NORMAL

## 2017-05-23 ENCOUNTER — OFFICE VISIT (OUTPATIENT)
Dept: BURN CARE | Age: 27
End: 2017-05-23
Payer: MEDICAID

## 2017-05-23 VITALS
WEIGHT: 235 LBS | HEIGHT: 69 IN | DIASTOLIC BLOOD PRESSURE: 107 MMHG | BODY MASS INDEX: 34.8 KG/M2 | SYSTOLIC BLOOD PRESSURE: 192 MMHG | HEART RATE: 106 BPM | TEMPERATURE: 98.2 F

## 2017-05-23 DIAGNOSIS — T23.201A: Primary | ICD-10-CM

## 2017-05-23 DIAGNOSIS — T23.231A: Primary | ICD-10-CM

## 2017-05-23 PROCEDURE — 99203 OFFICE O/P NEW LOW 30 MIN: CPT

## 2017-05-23 PROCEDURE — 99203 OFFICE O/P NEW LOW 30 MIN: CPT | Performed by: PLASTIC SURGERY

## 2017-05-23 RX ORDER — OXYCODONE HYDROCHLORIDE AND ACETAMINOPHEN 5; 325 MG/1; MG/1
1 TABLET ORAL EVERY 4 HOURS PRN
Qty: 40 TABLET | Refills: 0 | Status: SHIPPED | OUTPATIENT
Start: 2017-05-23 | End: 2017-05-30

## 2017-05-23 RX ORDER — BACITRACIN, NEOMYCIN, POLYMYXIN B 400; 3.5; 5 [USP'U]/G; MG/G; [USP'U]/G
OINTMENT TOPICAL
Qty: 28.35 G | Refills: 3 | Status: SHIPPED | OUTPATIENT
Start: 2017-05-23 | End: 2017-06-02

## 2017-06-06 ENCOUNTER — OFFICE VISIT (OUTPATIENT)
Dept: BURN CARE | Age: 27
End: 2017-06-06
Payer: MEDICAID

## 2017-06-06 VITALS
DIASTOLIC BLOOD PRESSURE: 75 MMHG | SYSTOLIC BLOOD PRESSURE: 122 MMHG | BODY MASS INDEX: 35.25 KG/M2 | HEIGHT: 69 IN | WEIGHT: 238 LBS | HEART RATE: 97 BPM

## 2017-06-06 DIAGNOSIS — T23.201A: Primary | ICD-10-CM

## 2017-06-06 DIAGNOSIS — T23.231A: Primary | ICD-10-CM

## 2017-06-06 PROCEDURE — 99213 OFFICE O/P EST LOW 20 MIN: CPT

## 2017-06-06 PROCEDURE — 99212 OFFICE O/P EST SF 10 MIN: CPT | Performed by: PLASTIC SURGERY

## 2017-07-06 ENCOUNTER — TELEPHONE (OUTPATIENT)
Dept: BURN CARE | Age: 27
End: 2017-07-06

## 2018-04-26 ENCOUNTER — HOSPITAL ENCOUNTER (EMERGENCY)
Age: 28
Discharge: HOME | End: 2018-04-26
Attending: EMERGENCY MEDICINE
Payer: COMMERCIAL

## 2018-04-26 VITALS
HEART RATE: 100 BPM | BODY MASS INDEX: 31.5 KG/M2 | SYSTOLIC BLOOD PRESSURE: 143 MMHG | TEMPERATURE: 99.3 F | RESPIRATION RATE: 16 BRPM | DIASTOLIC BLOOD PRESSURE: 95 MMHG | HEIGHT: 70 IN | WEIGHT: 220 LBS | OXYGEN SATURATION: 99 %

## 2018-04-26 DIAGNOSIS — M54.42 ACUTE MIDLINE LOW BACK PAIN WITH LEFT-SIDED SCIATICA: Primary | ICD-10-CM

## 2018-04-26 PROCEDURE — 99282 EMERGENCY DEPT VISIT SF MDM: CPT

## 2018-04-26 RX ORDER — PREDNISONE 20 MG/1
TABLET ORAL
Qty: 12 TABLET | Refills: 0 | Status: SHIPPED | OUTPATIENT
Start: 2018-04-26 | End: 2018-05-07 | Stop reason: ALTCHOICE

## 2018-04-26 RX ORDER — HYDROCODONE BITARTRATE AND ACETAMINOPHEN 7.5; 325 MG/1; MG/1
1 TABLET ORAL EVERY 6 HOURS PRN
Qty: 12 TABLET | Refills: 0 | Status: SHIPPED | OUTPATIENT
Start: 2018-04-26 | End: 2018-04-29

## 2018-04-26 RX ORDER — CYCLOBENZAPRINE HCL 10 MG
10 TABLET ORAL 3 TIMES DAILY PRN
Qty: 30 TABLET | Refills: 0 | Status: SHIPPED | OUTPATIENT
Start: 2018-04-26 | End: 2018-05-06

## 2018-04-26 ASSESSMENT — ENCOUNTER SYMPTOMS
GASTROINTESTINAL NEGATIVE: 1
BACK PAIN: 1

## 2018-04-26 ASSESSMENT — PAIN DESCRIPTION - LOCATION: LOCATION: BACK

## 2018-04-26 ASSESSMENT — PAIN SCALES - GENERAL
PAINLEVEL_OUTOF10: 8
PAINLEVEL_OUTOF10: 7

## 2018-04-26 ASSESSMENT — PAIN DESCRIPTION - PAIN TYPE
TYPE: ACUTE PAIN
TYPE: ACUTE PAIN

## 2018-04-26 ASSESSMENT — PAIN DESCRIPTION - ORIENTATION: ORIENTATION: LEFT

## 2018-04-26 ASSESSMENT — PAIN DESCRIPTION - DESCRIPTORS: DESCRIPTORS: ACHING

## 2018-05-07 ENCOUNTER — HOSPITAL ENCOUNTER (EMERGENCY)
Age: 28
Discharge: HOME | End: 2018-05-07
Attending: EMERGENCY MEDICINE
Payer: COMMERCIAL

## 2018-05-07 VITALS
BODY MASS INDEX: 33.15 KG/M2 | RESPIRATION RATE: 16 BRPM | HEART RATE: 92 BPM | WEIGHT: 231 LBS | TEMPERATURE: 98.3 F | DIASTOLIC BLOOD PRESSURE: 95 MMHG | OXYGEN SATURATION: 97 % | SYSTOLIC BLOOD PRESSURE: 149 MMHG

## 2018-05-07 DIAGNOSIS — S39.012D STRAIN OF LUMBAR REGION, SUBSEQUENT ENCOUNTER: Primary | ICD-10-CM

## 2018-05-07 PROCEDURE — 99283 EMERGENCY DEPT VISIT LOW MDM: CPT

## 2018-05-07 RX ORDER — PREDNISONE 20 MG/1
TABLET ORAL
Qty: 12 TABLET | Refills: 0 | Status: SHIPPED | OUTPATIENT
Start: 2018-05-07 | End: 2019-08-20

## 2018-05-07 RX ORDER — HYDROCODONE BITARTRATE AND ACETAMINOPHEN 7.5; 325 MG/1; MG/1
1 TABLET ORAL EVERY 6 HOURS PRN
Qty: 12 TABLET | Refills: 0 | Status: SHIPPED | OUTPATIENT
Start: 2018-05-07 | End: 2018-05-10

## 2018-05-07 ASSESSMENT — PAIN SCALES - GENERAL
PAINLEVEL_OUTOF10: 8
PAINLEVEL_OUTOF10: 7

## 2018-05-07 ASSESSMENT — ENCOUNTER SYMPTOMS
GASTROINTESTINAL NEGATIVE: 1
BACK PAIN: 1

## 2018-05-07 ASSESSMENT — PAIN DESCRIPTION - PAIN TYPE
TYPE: ACUTE PAIN
TYPE: ACUTE PAIN

## 2018-05-07 ASSESSMENT — PAIN DESCRIPTION - LOCATION
LOCATION: BACK
LOCATION: BACK

## 2018-05-07 ASSESSMENT — PAIN DESCRIPTION - DESCRIPTORS
DESCRIPTORS: ACHING
DESCRIPTORS: ACHING

## 2018-05-07 ASSESSMENT — PAIN DESCRIPTION - FREQUENCY: FREQUENCY: CONTINUOUS

## 2018-05-07 ASSESSMENT — PAIN DESCRIPTION - ORIENTATION
ORIENTATION: MID;LOWER
ORIENTATION: MID;LOWER

## 2019-08-20 ENCOUNTER — HOSPITAL ENCOUNTER (EMERGENCY)
Age: 29
Discharge: HOME | End: 2019-08-20
Attending: FAMILY MEDICINE

## 2019-08-20 VITALS
OXYGEN SATURATION: 98 % | BODY MASS INDEX: 33.6 KG/M2 | DIASTOLIC BLOOD PRESSURE: 119 MMHG | HEIGHT: 71 IN | RESPIRATION RATE: 16 BRPM | WEIGHT: 240 LBS | HEART RATE: 120 BPM | TEMPERATURE: 98.1 F | SYSTOLIC BLOOD PRESSURE: 143 MMHG

## 2019-08-20 DIAGNOSIS — R03.0 ELEVATED BLOOD PRESSURE READING: ICD-10-CM

## 2019-08-20 DIAGNOSIS — F43.22 ADJUSTMENT REACTION WITH ANXIOUS MOOD: Primary | ICD-10-CM

## 2019-08-20 PROCEDURE — 99283 EMERGENCY DEPT VISIT LOW MDM: CPT

## 2019-08-20 RX ORDER — HYDROXYZINE PAMOATE 25 MG/1
25 CAPSULE ORAL 3 TIMES DAILY PRN
Qty: 30 CAPSULE | Refills: 0 | Status: SHIPPED | OUTPATIENT
Start: 2019-08-20 | End: 2019-09-03

## 2019-08-20 ASSESSMENT — ENCOUNTER SYMPTOMS
ABDOMINAL PAIN: 0
DIARRHEA: 0
WHEEZING: 0
SORE THROAT: 0
SHORTNESS OF BREATH: 0
NAUSEA: 0
BACK PAIN: 0
VOMITING: 0
COUGH: 0

## 2019-08-20 ASSESSMENT — PAIN SCALES - GENERAL: PAINLEVEL_OUTOF10: 8

## 2020-01-15 ENCOUNTER — HOSPITAL ENCOUNTER (EMERGENCY)
Age: 30
Discharge: HOME | End: 2020-01-15
Attending: FAMILY MEDICINE

## 2020-01-15 ENCOUNTER — APPOINTMENT (OUTPATIENT)
Dept: GENERAL RADIOLOGY | Age: 30
End: 2020-01-15

## 2020-01-15 VITALS
RESPIRATION RATE: 20 BRPM | HEART RATE: 111 BPM | BODY MASS INDEX: 34.8 KG/M2 | SYSTOLIC BLOOD PRESSURE: 119 MMHG | WEIGHT: 235 LBS | HEIGHT: 69 IN | TEMPERATURE: 98.1 F | DIASTOLIC BLOOD PRESSURE: 98 MMHG | OXYGEN SATURATION: 98 %

## 2020-01-15 PROCEDURE — 99284 EMERGENCY DEPT VISIT MOD MDM: CPT

## 2020-01-15 PROCEDURE — 72100 X-RAY EXAM L-S SPINE 2/3 VWS: CPT

## 2020-01-15 ASSESSMENT — ENCOUNTER SYMPTOMS
COUGH: 0
VOMITING: 0
SORE THROAT: 0
ABDOMINAL PAIN: 0
SHORTNESS OF BREATH: 0
BACK PAIN: 1
DIARRHEA: 0
NAUSEA: 0
WHEEZING: 0

## 2020-01-15 ASSESSMENT — PAIN SCALES - GENERAL: PAINLEVEL_OUTOF10: 8
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
## 2020-01-16 NOTE — ED NOTES
Discharged in police custody with handcuffs on. AVS discussed/reviewed with patient. Patient verbalized understanding of all instructions given; no questions/concerns voiced. Respirations easy, regular and non-labored; no distress noted. Skin pink, warm and dry; mucous membranes moist. Alert and appropriate for age. Ambulated to police cruiser.      Park Becerril RN  01/15/20 4294
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
## 2020-03-18 ENCOUNTER — OFFICE VISIT (OUTPATIENT)
Dept: INTERNAL MEDICINE CLINIC | Age: 30
End: 2020-03-18
Payer: MEDICAID

## 2020-03-18 VITALS
HEART RATE: 80 BPM | TEMPERATURE: 98.8 F | SYSTOLIC BLOOD PRESSURE: 167 MMHG | BODY MASS INDEX: 34.65 KG/M2 | HEIGHT: 70 IN | WEIGHT: 242 LBS | DIASTOLIC BLOOD PRESSURE: 96 MMHG

## 2020-03-18 LAB
AMPHETAMINE SCREEN, URINE: ABNORMAL
BARBITURATE SCREEN, URINE: ABNORMAL
BENZODIAZEPINE SCREEN, URINE: ABNORMAL
BUPRENORPHINE URINE: ABNORMAL
COCAINE METABOLITE SCREEN URINE: ABNORMAL
GABAPENTIN SCREEN, URINE: ABNORMAL
MDMA URINE: ABNORMAL
METHADONE SCREEN, URINE: ABNORMAL
METHAMPHETAMINE, URINE: ABNORMAL
OPIATE SCREEN URINE: ABNORMAL
OXYCODONE SCREEN URINE: ABNORMAL
PHENCYCLIDINE SCREEN URINE: ABNORMAL
PROPOXYPHENE SCREEN, URINE: ABNORMAL
THC SCREEN, URINE: ABNORMAL
TRICYCLIC ANTIDEPRESSANTS, UR: ABNORMAL

## 2020-03-18 PROCEDURE — 80305 DRUG TEST PRSMV DIR OPT OBS: CPT | Performed by: INTERNAL MEDICINE

## 2020-03-18 PROCEDURE — 99204 OFFICE O/P NEW MOD 45 MIN: CPT | Performed by: INTERNAL MEDICINE

## 2020-03-18 NOTE — PROGRESS NOTES
suicidal ideation or attempts     Endocrine: No thyroid issues,no neck pain, no galactorrhea, no weight changes     Pulmonary: No shortness of breath, orthopnea, PND     Cardiac: No chest pain,syncope, no history of cardiac issues     GI: No trouble with bowels, no abdominal pain     : No dysuria, nocturia, urgency, frequency     MS: Patient denies bone or joint aches, no myalgias     Neuro: Patient denies headaches, seizures, tremors     Skin: No skin lesions, rashes    PHYSICAL EXAM     Blood pressure (!) 167/96, pulse 80, temperature 98.8 °F (37.1 °C), height 5' 10\" (1.778 m), weight 242 lb (109.8 kg). Cows 10       General: Patient resting comfortably in no acute distress, he is sitting still     Mental status: Alert and oriented to person place and time, no hallucinations or delusions     Eyes: Pupils are mildly dilated     Neck: Supple no JVD or bruits     Pulmonary: Good respiratory effort no wheezes rales or rhonchi     Cardiac: Normal S1 normal S2 no murmurs gallops or rubs     Abdomen nondistended nontender no organomegaly      Extremities: No cyanosis clubbing or edema     Neurologic: No focal neurologic deficit detected, no tremors     Musculoskeletal: No abnormal joint findings or muscle findings     Skin: No rashes, lesions or abnormalities noted        URINE DRUG SCREEN TODAY:  Recent Labs     03/19/20  1010   LABAMPH NEG   LABBARB NEG   LABBENZ NEG   BUPRENUR POS   COCAIMETSCRU NEG   GABAPENTIN N/A   MDMA NEG   METAMPU NEG   LABMETH NEG   OPIATESCREENURINE NEG   OXTCOSU NEG   PHENCYCLIDINESCREENURINE NEG   PROPOXYPHENE N/A   THCSCREENUR NEG   TRICYUR N/A      Positive for fentanyl     Diagnosis Orders   1. Severe opioid use disorder (HCC)  POCT Rapid Drug Screen         PLAN:  Provider provided education on Medication Assisted Treatment options, including: Suboxone, Sublocade, Methadone, and Naltrexone/Vivitrol  Allowed opportunity to respond to questions regarding the treatment options. Patient voices that their treatment preference is suboxone. I feel that this patient is an appropriate candidate for this treatment option. Education given on the importance of combining Medication Assisted Treatment with comprehensive treatment, including: individual counseling, treatment groups, community support groups, and psychiatry as applicable. Patient will meet with  to review clinic counseling expectations and to be linked to appropriate services. Provider reviewed medication contract with patient. Patient is agreeable to the program expectations. Both patient and provider signed the medication contract. Patient instructed to go to [address] [place] [address] to watch a video and learn about [facility]. I told patient [facility] is an opioid antagonist that reverses respiratory depression caused by opioids. Pharmacy will give patient or family member [facility] and explain how to use in an emergency.   I reviewed the [place] Automated Rx Reporting System report     There does not appear to be any discrepancies or overprescribing of controlled substances    Patient used pain pills last night at 8 PM  They may have had fentanyl as he is fentanyl positive  He is in mild withdrawal at the present time  I told him I am going to give him Suboxone to take home to use later tonight  I told him to at least wait till 8 PM to use 4 mg  We will also give him 4 mg the morning  Grecia Beltran will help him set up counseling and meetings he lives in [facility] which is an hour [place] a half away  Follow-up here tomorrow

## 2020-03-19 ENCOUNTER — OFFICE VISIT (OUTPATIENT)
Dept: INTERNAL MEDICINE CLINIC | Age: 30
End: 2020-03-19
Payer: MEDICAID

## 2020-03-19 VITALS
SYSTOLIC BLOOD PRESSURE: 145 MMHG | TEMPERATURE: 98.7 F | HEIGHT: 70 IN | BODY MASS INDEX: 34.65 KG/M2 | DIASTOLIC BLOOD PRESSURE: 95 MMHG | HEART RATE: 83 BPM | WEIGHT: 242 LBS

## 2020-03-19 PROCEDURE — 99213 OFFICE O/P EST LOW 20 MIN: CPT | Performed by: INTERNAL MEDICINE

## 2020-03-19 PROCEDURE — 80305 DRUG TEST PRSMV DIR OPT OBS: CPT | Performed by: INTERNAL MEDICINE

## 2020-03-19 RX ORDER — BUPRENORPHINE AND NALOXONE 4; 1 MG/1; MG/1
1 FILM, SOLUBLE BUCCAL; SUBLINGUAL 2 TIMES DAILY
Qty: 12 FILM | Refills: 0 | Status: SHIPPED | OUTPATIENT
Start: 2020-03-19 | End: 2020-03-24 | Stop reason: SDUPTHER

## 2020-03-19 RX ORDER — BUPRENORPHINE AND NALOXONE 4; 1 MG/1; MG/1
1 FILM, SOLUBLE BUCCAL; SUBLINGUAL 2 TIMES DAILY
COMMUNITY
End: 2020-03-19 | Stop reason: SDUPTHER

## 2020-03-19 NOTE — PROGRESS NOTES
MEDICATION ASSISTED TREATMENT ENCOUNTER    HISTORY OF PRESENT ILLNESS  Patient presents for evaluation of opioid use and would like to be placed on medication assisted treatment  I saw him here for the first time yesterday  (March 18)  He is referred here by his stepsister who is a patient of mine  Patient has had problems using pain pills, heroin  Patient started using pain pills7 years ago  A doctor initially prescribed them for a back injury  When those ran out he went to the street  He said for the past 2 months he has not been able to get any on the street so he turned to heroin  Patient uses it snorting, he has never used IV  Other drugs used: When he cannot find anything else he will go to benzos, he also smokes weed  Suboxone programs in the past [facility] [place], he detox for 3 days was there for a month  Vivitrol in the past no  Last use of heroin Sunday he used heroin use painkillers last night  Patient would typically use  do;;ars leslie;y daily  Ever used Suboxone off the street yes last time at least a month or 2 ago    Past Medical History:   Diagnosis Date    Anxiety     Back pain        Past Surgical History:   Procedure Laterality Date    EYE SURGERY         PAST PSYCHIATRIC HISTORY: no    No Known Allergies    Current Outpatient Medications   Medication Sig Dispense Refill    buprenorphine-naloxone (SUBOXONE) 4-1 MG FILM SL film Place 1 Film under the tongue 2 times daily for 6 days. 12 Film 0     No current facility-administered medications for this visit. ROS     General:   PHYSICAL EXAM     Blood pressure (!) 145/95, pulse 83, temperature 98.7 °F (37.1 °C), height 5' 10\" (1.778 m), weight 242 lb (109.8 kg).   Cows 10       General: Patient resting comfortably in no acute distress, he is sitting still     Mental status: Alert and oriented to person place and time, no hallucinations or

## 2020-03-19 NOTE — PROGRESS NOTES
Verbal order per Dr. Rolan Carrel for urine drug screen. Positive for BUP, FENT. Verified results with Margie Garber LPN. Dr. Rolan Carrel ordered Suboxone 4 mg film BID for patient. Verified dose with patient. Patient was sent home with 6 day script for Suboxone 4 mg film BID and will be seen back in the office on 3/25/2020.

## 2020-03-24 ENCOUNTER — OFFICE VISIT (OUTPATIENT)
Dept: INTERNAL MEDICINE CLINIC | Age: 30
End: 2020-03-24
Payer: MEDICAID

## 2020-03-24 VITALS
HEIGHT: 70 IN | SYSTOLIC BLOOD PRESSURE: 133 MMHG | TEMPERATURE: 98.2 F | DIASTOLIC BLOOD PRESSURE: 89 MMHG | WEIGHT: 242.06 LBS | HEART RATE: 97 BPM | BODY MASS INDEX: 34.65 KG/M2

## 2020-03-24 PROCEDURE — 99213 OFFICE O/P EST LOW 20 MIN: CPT | Performed by: INTERNAL MEDICINE

## 2020-03-24 PROCEDURE — 80305 DRUG TEST PRSMV DIR OPT OBS: CPT | Performed by: INTERNAL MEDICINE

## 2020-03-24 RX ORDER — BUPRENORPHINE AND NALOXONE 4; 1 MG/1; MG/1
1 FILM, SOLUBLE BUCCAL; SUBLINGUAL 2 TIMES DAILY
Qty: 28 FILM | Refills: 0 | Status: SHIPPED | OUTPATIENT
Start: 2020-03-24 | End: 2020-04-07

## 2020-03-24 NOTE — PROGRESS NOTES
MEDICATION ASSISTED TREATMENT ENCOUNTER    HISTORY OF PRESENT ILLNESS  Patient presents for evaluation of opioid use and would like to be placed on medication assisted treatment  I saw him here for the first time   (March 18), last time 3/19  He is referred here by his stepsister who is a patient of mine  Patient has had problems using pain pills, heroin  Patient started using pain pills7 years ago  A doctor initially prescribed them for a back injury  When those ran out he went to the street  He said for the past 2 months he has not been able to get any on the street so he turned to heroin  Patient uses it snorting, he has never used IV  Other drugs used: When he cannot find anything else he will go to [facility], he also smokes weed  Suboxone programs in the past Suboxone program in [place], he detox for 3 days was there for a month  Vivitrol in the past no  Last use of heroin Sunday he used heroin use painkillers last week  Patient would typically use  do;;ars leslie;y daily  Ever used Suboxone off the street yes last time at least a month or 2 ago    Past Medical History:   Diagnosis Date    Anxiety     Back pain        ROS     General: Patient is feeling well  Patient is not experiencing  withdrawal symptoms ,no urges or cravings  Patient is not having any side effects from the buprenorphine    PHYSICAL EXAM     Blood pressure 133/89, pulse 97, temperature 98.2 °F (36.8 °C), height 5' 10\" (1.778 m), weight 242 lb 1 oz (109.8 kg).          General: Patient resting comfortably in no acute distress, he is sitting still     Mental status: Alert and oriented to person place and time, no hallucinations or delusions     Eyes: Pupils are mildly dilated           URINE DRUG SCREEN TODAY:  Recent Labs     03/24/20  1052   LABAMPH NEG   LABBARB NEG   LABBENZ NEG   BUPRENUR POS   COCAIMETSCRU NEG   GABAPENTIN N/A   MDMA NEG   METAMPU NEG   LABMETH NEG OPIATESCREENURINE NEG   OXTCOSU NEG   PHENCYCLIDINESCREENURINE NEG   PROPOXYPHENE N/A   THCSCREENUR POS   TRICYUR N/A           Diagnosis Orders   1. Severe opioid use disorder (HCC)  POCT Rapid Drug Screen    buprenorphine-naloxone (SUBOXONE) 4-1 MG FILM SL film   2.  Encounter for monitoring Suboxone maintenance therapy           PLAN:  He is on 4 mg Suboxone twice daily  He is not experiencing any withdrawal  no side effects from the Suboxone  We will continue Suboxone 4 mg twice daily  I reviewed the [place] Automated Rx Reporting System report     There does not appear to be any discrepancies or overprescribing of controlled substances    Follow-up  14 days

## 2020-03-24 NOTE — PROGRESS NOTES
Verbal order per Dr. Waleska Martinez for urine drug screen. Positive for BUP THC. Verified results with Michelle Juárez RN. Dr. Waleska Martinez ordered Suboxone 4mg film BID  for patient. Verified dose with patient. Patient was sent home with 2 week script of Suboxone 4mg film BID and will be seen back in the office 4/7/2020.

## 2020-04-07 ENCOUNTER — VIRTUAL VISIT (OUTPATIENT)
Dept: INTERNAL MEDICINE CLINIC | Age: 30
End: 2020-04-07
Payer: MEDICARE

## 2020-04-07 PROCEDURE — 99213 OFFICE O/P EST LOW 20 MIN: CPT | Performed by: INTERNAL MEDICINE

## 2020-04-07 PROCEDURE — G8417 CALC BMI ABV UP PARAM F/U: HCPCS | Performed by: INTERNAL MEDICINE

## 2020-04-07 PROCEDURE — 4004F PT TOBACCO SCREEN RCVD TLK: CPT | Performed by: INTERNAL MEDICINE

## 2020-04-07 PROCEDURE — G8428 CUR MEDS NOT DOCUMENT: HCPCS | Performed by: INTERNAL MEDICINE

## 2020-04-07 RX ORDER — BUPRENORPHINE AND NALOXONE 4; 1 MG/1; MG/1
1 FILM, SOLUBLE BUCCAL; SUBLINGUAL 2 TIMES DAILY
Qty: 14 FILM | Refills: 0 | Status: SHIPPED | OUTPATIENT
Start: 2020-04-07 | End: 2020-04-14 | Stop reason: SDUPTHER

## 2020-04-07 NOTE — PROGRESS NOTES
daily  Once again he is in self quarantine as he was exposed to a patient with Covid-19  Follow-up 7 days with video visit  --Shara Lamas MD on 4/7/2020 at 12:06 PM    An electronic signature was used to authenticate this note.

## 2020-04-14 ENCOUNTER — VIRTUAL VISIT (OUTPATIENT)
Dept: INTERNAL MEDICINE CLINIC | Age: 30
End: 2020-04-14
Payer: MEDICARE

## 2020-04-14 PROCEDURE — 99213 OFFICE O/P EST LOW 20 MIN: CPT | Performed by: INTERNAL MEDICINE

## 2020-04-14 PROCEDURE — 4004F PT TOBACCO SCREEN RCVD TLK: CPT | Performed by: INTERNAL MEDICINE

## 2020-04-14 PROCEDURE — G8417 CALC BMI ABV UP PARAM F/U: HCPCS | Performed by: INTERNAL MEDICINE

## 2020-04-14 PROCEDURE — G8428 CUR MEDS NOT DOCUMENT: HCPCS | Performed by: INTERNAL MEDICINE

## 2020-04-14 RX ORDER — BUPRENORPHINE AND NALOXONE 4; 1 MG/1; MG/1
1 FILM, SOLUBLE BUCCAL; SUBLINGUAL 2 TIMES DAILY
Qty: 28 FILM | Refills: 0 | Status: SHIPPED | OUTPATIENT
Start: 2020-04-14 | End: 2020-04-28

## 2020-04-14 NOTE — PROGRESS NOTES
Abnormal -  Pupils normal  HENT:   [x] Normocephalic, atraumatic. [] Abnormal   [] Mouth/Throat: Mucous membranes are moist.     Psychiatric:       [x] Normal Affect [] No Hallucinations        [] Abnormal-     -      Diagnosis Orders   1. Severe opioid use disorder (HCC)  buprenorphine-naloxone (SUBOXONE) 4-1 MG FILM SL film   2. Encounter for monitoring Suboxone maintenance therapy         Alisha Stover is a 26 y.o. male being evaluated by a Virtual Visit (video visit) encounter to address concerns as mentioned above. A caregiver was present when appropriate. Due to this being a TeleHealth encounter (During [facility]-30 public health emergency), evaluation of the following organ systems was limited: Vitals/Constitutional/EENT/Resp/CV/GI//MS/Neuro/Skin/Heme-Lymph-Imm. Pursuant to the emergency declaration under the [address] [place] [address] and the [employer] and [employer] Act, this Virtual Visit was conducted with patient's (and/or legal guardian's) consent, to reduce the patient's risk of exposure to COVID-19 and provide necessary medical care. The patient (and/or legal guardian) has also been advised to contact this office for worsening conditions or problems, and seek emergency medical treatment and/or call 911 if deemed necessary. Services were provided through a video synchronous discussion virtually to substitute for in-person clinic visit. I reviewed the [place] Automated Rx Reporting System report     There does not appear to be any discrepancies or overprescribing of controlled substances    Follow up 2 weeks with a video visit  --Julien Beltre MD on 4/14/2020 at 12:33 PM    An electronic signature was used to authenticate this note.

## 2020-04-28 ENCOUNTER — VIRTUAL VISIT (OUTPATIENT)
Dept: INTERNAL MEDICINE CLINIC | Age: 30
End: 2020-04-28
Payer: MEDICARE

## 2020-04-28 PROCEDURE — G8417 CALC BMI ABV UP PARAM F/U: HCPCS | Performed by: INTERNAL MEDICINE

## 2020-04-28 PROCEDURE — 4004F PT TOBACCO SCREEN RCVD TLK: CPT | Performed by: INTERNAL MEDICINE

## 2020-04-28 PROCEDURE — G8428 CUR MEDS NOT DOCUMENT: HCPCS | Performed by: INTERNAL MEDICINE

## 2020-04-28 PROCEDURE — 99213 OFFICE O/P EST LOW 20 MIN: CPT | Performed by: INTERNAL MEDICINE

## 2020-04-28 RX ORDER — BUPRENORPHINE AND NALOXONE 12; 3 MG/1; MG/1
1 FILM, SOLUBLE BUCCAL; SUBLINGUAL DAILY
Qty: 14 FILM | Refills: 0 | Status: SHIPPED | OUTPATIENT
Start: 2020-04-28 | End: 2020-05-12 | Stop reason: SDUPTHER

## 2020-04-28 RX ORDER — BUPRENORPHINE AND NALOXONE 4; 1 MG/1; MG/1
1 FILM, SOLUBLE BUCCAL; SUBLINGUAL 2 TIMES DAILY
Qty: 28 FILM | Refills: 0 | Status: CANCELLED | OUTPATIENT
Start: 2020-04-28 | End: 2020-05-12

## 2020-05-12 ENCOUNTER — VIRTUAL VISIT (OUTPATIENT)
Dept: INTERNAL MEDICINE CLINIC | Age: 30
End: 2020-05-12
Payer: MEDICARE

## 2020-05-12 PROCEDURE — 4004F PT TOBACCO SCREEN RCVD TLK: CPT | Performed by: INTERNAL MEDICINE

## 2020-05-12 PROCEDURE — 99213 OFFICE O/P EST LOW 20 MIN: CPT | Performed by: INTERNAL MEDICINE

## 2020-05-12 PROCEDURE — G8417 CALC BMI ABV UP PARAM F/U: HCPCS | Performed by: INTERNAL MEDICINE

## 2020-05-12 PROCEDURE — G8428 CUR MEDS NOT DOCUMENT: HCPCS | Performed by: INTERNAL MEDICINE

## 2020-05-12 RX ORDER — BUPRENORPHINE AND NALOXONE 12; 3 MG/1; MG/1
1 FILM, SOLUBLE BUCCAL; SUBLINGUAL DAILY
Qty: 16 FILM | Refills: 0 | Status: SHIPPED | OUTPATIENT
Start: 2020-05-12 | End: 2020-05-28 | Stop reason: SDUPTHER
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
## 2020-05-28 RX ORDER — BUPRENORPHINE AND NALOXONE 12; 3 MG/1; MG/1
1 FILM, SOLUBLE BUCCAL; SUBLINGUAL DAILY
Qty: 4 FILM | Refills: 0 | OUTPATIENT
Start: 2020-05-28 | End: 2020-06-02 | Stop reason: SDUPTHER

## 2020-06-02 ENCOUNTER — OFFICE VISIT (OUTPATIENT)
Dept: INTERNAL MEDICINE CLINIC | Age: 30
End: 2020-06-02
Payer: MEDICARE

## 2020-06-02 VITALS
HEIGHT: 70 IN | DIASTOLIC BLOOD PRESSURE: 111 MMHG | TEMPERATURE: 98.7 F | HEART RATE: 104 BPM | SYSTOLIC BLOOD PRESSURE: 179 MMHG | BODY MASS INDEX: 34.22 KG/M2 | WEIGHT: 239 LBS

## 2020-06-02 PROCEDURE — G8417 CALC BMI ABV UP PARAM F/U: HCPCS | Performed by: INTERNAL MEDICINE

## 2020-06-02 PROCEDURE — 80305 DRUG TEST PRSMV DIR OPT OBS: CPT | Performed by: INTERNAL MEDICINE

## 2020-06-02 PROCEDURE — G8427 DOCREV CUR MEDS BY ELIG CLIN: HCPCS | Performed by: INTERNAL MEDICINE

## 2020-06-02 PROCEDURE — 99213 OFFICE O/P EST LOW 20 MIN: CPT | Performed by: INTERNAL MEDICINE

## 2020-06-02 PROCEDURE — 4004F PT TOBACCO SCREEN RCVD TLK: CPT | Performed by: INTERNAL MEDICINE

## 2020-06-02 RX ORDER — BUPRENORPHINE AND NALOXONE 12; 3 MG/1; MG/1
1 FILM, SOLUBLE BUCCAL; SUBLINGUAL DAILY
Qty: 28 FILM | Refills: 0 | Status: SHIPPED | OUTPATIENT
Start: 2020-06-02 | End: 2020-06-30

## 2020-07-01 ENCOUNTER — TELEPHONE (OUTPATIENT)
Dept: INTERNAL MEDICINE CLINIC | Age: 30
End: 2020-07-01

## 2020-07-02 ENCOUNTER — TELEPHONE (OUTPATIENT)
Dept: INTERNAL MEDICINE CLINIC | Age: 30
End: 2020-07-02

## 2020-07-02 NOTE — TELEPHONE ENCOUNTER
Patient called in requested to reschedule his OBOT appt. I told patient I would talk to Verenice Miranda and call him back. Per KD she ok'd nurse visit. I called # [phone]  And it just rang busy.

## 2020-07-06 ENCOUNTER — OFFICE VISIT (OUTPATIENT)
Dept: INTERNAL MEDICINE CLINIC | Age: 30
End: 2020-07-06
Payer: MEDICARE

## 2020-07-06 ENCOUNTER — TELEPHONE (OUTPATIENT)
Dept: INTERNAL MEDICINE CLINIC | Age: 30
End: 2020-07-06

## 2020-07-06 VITALS
BODY MASS INDEX: 33.5 KG/M2 | WEIGHT: 234 LBS | HEART RATE: 83 BPM | SYSTOLIC BLOOD PRESSURE: 168 MMHG | TEMPERATURE: 97.5 F | HEIGHT: 70 IN | DIASTOLIC BLOOD PRESSURE: 115 MMHG

## 2020-07-06 PROCEDURE — 99213 OFFICE O/P EST LOW 20 MIN: CPT | Performed by: INTERNAL MEDICINE

## 2020-07-06 PROCEDURE — G8428 CUR MEDS NOT DOCUMENT: HCPCS | Performed by: INTERNAL MEDICINE

## 2020-07-06 PROCEDURE — 4004F PT TOBACCO SCREEN RCVD TLK: CPT | Performed by: INTERNAL MEDICINE

## 2020-07-06 PROCEDURE — G8417 CALC BMI ABV UP PARAM F/U: HCPCS | Performed by: INTERNAL MEDICINE

## 2020-07-06 PROCEDURE — 80305 DRUG TEST PRSMV DIR OPT OBS: CPT | Performed by: INTERNAL MEDICINE

## 2020-07-06 RX ORDER — BUPRENORPHINE AND NALOXONE 12; 3 MG/1; MG/1
1 FILM, SOLUBLE BUCCAL; SUBLINGUAL DAILY
Qty: 28 FILM | Refills: 0 | Status: SHIPPED | OUTPATIENT
Start: 2020-07-06 | End: 2020-08-05 | Stop reason: SDUPTHER

## 2020-07-06 RX ORDER — BUPRENORPHINE AND NALOXONE 12; 3 MG/1; MG/1
1 FILM, SOLUBLE BUCCAL; SUBLINGUAL DAILY
COMMUNITY
End: 2020-07-06 | Stop reason: SDUPTHER

## 2020-07-06 NOTE — PROGRESS NOTES
Verbal order per Dr. Brendan Newby for urine drug screen. Positive for BUP . Verified results with Cindy Foote RN. Dr. Brendan Newby ordered Suboxone 12mg film daily  for patient. Verified dose with patient. Patient was sent home with 4 week script of Suboxone 12mg film daily and will be seen back in the office 8/3/20.

## 2020-07-06 NOTE — TELEPHONE ENCOUNTER
Patient called and requested to come today for appt for OBOT clinic. I asked Dr. Kamryn Berry he approved for patient to come today at 3:30pm. I called patient at [phone] -no answer and just rang busy.

## 2020-07-06 NOTE — PROGRESS NOTES
MEDICATION ASSISTED TREATMENT ENCOUNTER    HISTORY OF PRESENT ILLNESS  Patient presents for evaluation of opioid use and would like to be placed on medication assisted treatment  I saw him here for the first time   (March 18), last time 6/2  We had a video visit 5/12  He was scheduled with a virtual visit on 6/30 with Dr. Ciarra Shepard but could not make it  He is referred here by his stepsister who is a patient of mine  Patient has had problems using pain pills, heroin  Patient started using pain pills7 years ago  A doctor initially prescribed them for a back injury  Patient builds grain bins  He says he made his Suboxone last and has not relapsed  He took his last one this morning    Past Medical History:   Diagnosis Date    Anxiety     Back pain        ROS     General: Patient is feeling well  Patient is not experiencing  withdrawal symptoms ,no urges or cravings  Patient is not having any side effects from the buprenorphine    PHYSICAL EXAM     Blood pressure (!) 168/115, pulse 83, temperature 97.5 °F (36.4 °C), height 5' 10\" (1.778 m), weight 234 lb (106.1 kg). General: Patient resting comfortably in no acute distress, he is sitting still     Mental status: Alert and oriented to person place and time, no hallucinations or delusions     Eyes: Pupils are mildly dilated           URINE DRUG SCREEN TODAY:  No results for input(s): Stacie Parish, LABBENZ, BUPRENUR, COCAIMETSCRU, GABAPENTIN, MDMA, METAMPU, LABMETH, OPIATESCREENURINE, OXTCOSU, PHENCYCLIDINESCREENURINE, PROPOXYPHENE, THCSCREENUR, TRICYUR in the last 72 hours. Negative for fentanyl     Diagnosis Orders   1. Severe opioid use disorder (HCC)  POCT Rapid Drug Screen    buprenorphine-naloxone (SUBOXONE) 12-3 MG sublingual film   2.  Encounter for monitoring Suboxone maintenance therapy           PLAN:  He is on 12 mg daily  He is not experiencing any withdrawal  no side effects from the

## 2020-07-24 ENCOUNTER — TELEPHONE (OUTPATIENT)
Dept: INTERNAL MEDICINE CLINIC | Age: 30
End: 2020-07-24

## 2020-07-24 NOTE — TELEPHONE ENCOUNTER
Pt called stating that he will not be able to make it to his appointment on 8/3/20, due to [place] going [place] to [place], he would like to get a script today if possible, he is a precious pt . I spoke with Lora Lee she stated she can not prescribe out of state and his insurance will not allows to prescribe early.   Will have to talk to  on Monday

## 2020-07-28 ENCOUNTER — TELEPHONE (OUTPATIENT)
Dept: INTERNAL MEDICINE CLINIC | Age: 30
End: 2020-07-28

## 2020-08-05 ENCOUNTER — TELEPHONE (OUTPATIENT)
Dept: INTERNAL MEDICINE CLINIC | Age: 30
End: 2020-08-05

## 2020-08-05 ENCOUNTER — VIRTUAL VISIT (OUTPATIENT)
Dept: INTERNAL MEDICINE CLINIC | Age: 30
End: 2020-08-05
Payer: MEDICARE

## 2020-08-05 PROCEDURE — 4004F PT TOBACCO SCREEN RCVD TLK: CPT | Performed by: INTERNAL MEDICINE

## 2020-08-05 PROCEDURE — G8417 CALC BMI ABV UP PARAM F/U: HCPCS | Performed by: INTERNAL MEDICINE

## 2020-08-05 PROCEDURE — 99213 OFFICE O/P EST LOW 20 MIN: CPT | Performed by: INTERNAL MEDICINE

## 2020-08-05 PROCEDURE — G8428 CUR MEDS NOT DOCUMENT: HCPCS | Performed by: INTERNAL MEDICINE

## 2020-08-05 RX ORDER — BUPRENORPHINE AND NALOXONE 12; 3 MG/1; MG/1
1 FILM, SOLUBLE BUCCAL; SUBLINGUAL DAILY
Qty: 24 FILM | Refills: 0 | Status: SHIPPED | OUTPATIENT
Start: 2020-08-05 | End: 2020-09-14 | Stop reason: SDUPTHER

## 2020-08-05 RX ORDER — BUPRENORPHINE AND NALOXONE 12; 3 MG/1; MG/1
1 FILM, SOLUBLE BUCCAL; SUBLINGUAL DAILY
Qty: 4 FILM | Refills: 0 | Status: SHIPPED | OUTPATIENT
Start: 2020-08-05 | End: 2020-08-05

## 2020-08-05 RX ORDER — BUPRENORPHINE AND NALOXONE 8; 2 MG/1; MG/1
1.5 FILM, SOLUBLE BUCCAL; SUBLINGUAL DAILY
Qty: 6 FILM | Refills: 0 | OUTPATIENT
Start: 2020-08-05 | End: 2020-08-09

## 2020-08-05 NOTE — PROGRESS NOTES
Pt called stating the [facility] in [place], [place] did not have the dose he needs (12 mg films). RN called pharmacy and changed dose to Suboxone 8 mg film (1.5 films daily) for 4 days qty 6 to [facility] per Dr. Tonny Lane.

## 2020-08-05 NOTE — PROGRESS NOTES
2020    TELEHEALTH EVALUATION -- Audio/Visual (During [facility] public health emergency)    HPI:  A video conference was done with the patient  Patient was at work out of state, I was in the office  There was no  one else  present during the conference    Darek Jensen (:  1990) has requested an audio/video evaluation for the following concern(s):    HPI  I saw him here for the first time   (), last time   We had a video visit   He was scheduled with a virtual visit on  with Dr. Oksana Garcia but could not make it  He is referred here by his stepsister who is a patient of mine  Patient has had problems using pain pills, heroin  Patient started using pain pills7 years ago  A doctor initially prescribed them for a back injury  Patient builds grain bins  He says he made his Suboxone last and has not relapsed  He took his last one this morning     ROS-Patient is feeling well  Patient is not experiencing  withdrawal symptoms ,no urges or cravings  Patient is not having any side effects from the buprenorphine      Prior to Visit Medications    Medication Sig Taking? Authorizing Provider   buprenorphine-naloxone (SUBOXONE) 12-3 MG sublingual film Place 1 Film under the tongue daily for 24 days. Yes Nino Holloway MD   buprenorphine-naloxone (SUBOXONE) 8-2 MG FILM SL film Place 1.5 Film under the tongue daily for 4 days. Yes Nino Holloway MD       Social History     Tobacco Use    Smoking status: Current Every Day Smoker     Packs/day: 0.50     Types: Cigarettes    Smokeless tobacco: Never Used   Substance Use Topics    Alcohol use: No    Drug use: Yes     Types: Opiates , Marijuana     Comment: last used vicodin 3/17/2020.  last used heroin/fentanyl 3/15/2020            PHYSICAL EXAMINATION:  [ INSTRUCTIONS:  \"[x]\" Indicates a positive item  \"[]\" Indicates a negative item  -- DELETE ALL ITEMS NOT EXAMINED]  No vitals done    Constitutional: [x] Appears well-developed and well-nourished [x] No apparent distress      [] Abnormal-   Mental status  [x] Alert and awake  [x] Oriented to person/place/time [x]Able to follow commands      Eyes:  EOM    [x]  Normal  [] Abnormal-  Sclera  []  Normal  [] Abnormal -         Discharge []  None visible  [] Abnormal -  Pupils normal           Psychiatric:       [x] Normal Affect [] No Hallucinations        [] Abnormal-        Diagnosis Orders   1. Severe opioid use disorder (HCC)  buprenorphine-naloxone (SUBOXONE) 12-3 MG sublingual film    buprenorphine-naloxone (SUBOXONE) 8-2 MG FILM SL film    DISCONTINUED: buprenorphine-naloxone (SUBOXONE) 12-3 MG sublingual film   2. Encounter for monitoring Suboxone maintenance therapy           Anat Alcantar is a 26 y.o. male being evaluated by a Virtual Visit (video visit) encounter to address concerns as mentioned above. A caregiver was present when appropriate. Due to this being a TeleHealth encounter (During [facility]-56 public health emergency), evaluation of the following organ systems was limited: Vitals/Constitutional/EENT/Resp/CV/GI//MS/Neuro/Skin/Heme-Lymph-Imm. Pursuant to the emergency declaration under the [address] [place] [address] authority and the [employer] Act, this Virtual Visit was conducted with patient's (and/or legal guardian's) consent, to reduce the patient's risk of exposure to COVID-19 and provide necessary medical care. The patient (and/or legal guardian) has also been advised to contact this office for worsening conditions or problems, and seek emergency medical treatment and/or call 911 if deemed necessary. Services were provided through a video synchronous discussion virtually to substitute for in-person clinic visit. Follow-up 3 weeks  --Mukesh Almanza MD on 8/7/2020 at 4:03 PM    An electronic signature was used to authenticate this note.

## 2020-09-14 ENCOUNTER — TELEPHONE (OUTPATIENT)
Dept: INTERNAL MEDICINE CLINIC | Age: 30
End: 2020-09-14

## 2020-09-14 ENCOUNTER — VIRTUAL VISIT (OUTPATIENT)
Dept: INTERNAL MEDICINE CLINIC | Age: 30
End: 2020-09-14
Payer: MEDICARE

## 2020-09-14 PROCEDURE — 4004F PT TOBACCO SCREEN RCVD TLK: CPT | Performed by: INTERNAL MEDICINE

## 2020-09-14 PROCEDURE — 99213 OFFICE O/P EST LOW 20 MIN: CPT | Performed by: INTERNAL MEDICINE

## 2020-09-14 PROCEDURE — G8427 DOCREV CUR MEDS BY ELIG CLIN: HCPCS | Performed by: INTERNAL MEDICINE

## 2020-09-14 PROCEDURE — G8417 CALC BMI ABV UP PARAM F/U: HCPCS | Performed by: INTERNAL MEDICINE

## 2020-09-14 RX ORDER — BUPRENORPHINE AND NALOXONE 12; 3 MG/1; MG/1
1 FILM, SOLUBLE BUCCAL; SUBLINGUAL DAILY
Qty: 7 FILM | Refills: 0 | Status: SHIPPED | OUTPATIENT
Start: 2020-09-14 | End: 2020-09-21 | Stop reason: SDUPTHER

## 2020-09-14 NOTE — TELEPHONE ENCOUNTER
LPN called and cancelled script of Suboxone 12mg film d/t pharmacy being out of 12mg film. LPN called in script of Suboxone 8mg film 1.5 films daily for 7 days qty 11.

## 2020-09-14 NOTE — PROGRESS NOTES
2020    TELEHEALTH EVALUATION -- Audio/Visual (During CLLPG-22 public health emergency)    HPI:  A video conference was done with the patient  Patient was at home, I was in the office  There was no  one else  present during the conference    Vera Mike (:  1990) has requested an audio/video evaluation for the following concern(s):    HPI  I saw him here for the first time   (), last time   We had a video visit   He was scheduled with a virtual visit on  with Dr. 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 Authorizing Provider   buprenorphine-naloxone (SUBOXONE) 12-3 MG sublingual film Place 1 Film under the tongue daily for 7 days. Yes Gokul Segovia MD       Social History     Tobacco Use    Smoking status: Current Every Day Smoker     Packs/day: 0.50     Types: Cigarettes    Smokeless tobacco: Never Used   Substance Use Topics    Alcohol use: No    Drug use: Yes     Types: Opiates , Marijuana     Comment: last used vicodin 3/17/2020.  last used heroin/fentanyl 3/15/2020            PHYSICAL EXAMINATION:  [ INSTRUCTIONS:  \"[x]\" Indicates a positive item  \"[]\" Indicates a negative item  -- DELETE ALL ITEMS NOT EXAMINED]  No vitals done    Constitutional: [x] Appears well-developed and well-nourished [x] No apparent distress      [] Abnormal-   Mental status  [x] Alert and awake  [x] Oriented to person/place/time [x]Able to follow commands      Eyes:  EOM    [x]  Normal  [] Abnormal-  Sclera  []  Normal  [] Abnormal -         Discharge []  None visible  [] Abnormal -  Pupils normal           Psychiatric:       [x] Normal Affect [] No Hallucinations        [] Abnormal-        Diagnosis Orders   1. Encounter for monitoring Suboxone maintenance therapy     2. Severe opioid use disorder (HCC)  buprenorphine-naloxone (SUBOXONE) 12-3 MG sublingual film         Mane Ballard is a 31 y.o. male being evaluated by a Virtual Visit (video visit) encounter to address concerns as mentioned above. A caregiver was present when appropriate. Due to this being a TeleHealth encounter ([facility]), evaluation of the following organ systems was limited: Vitals/Constitutional/EENT/Resp/CV/GI//MS/Neuro/Skin/Heme-Lymph-Imm. Pursuant to the emergency declaration under the [address] [place] [address] authority and the [employer] and [employer] Act, this Virtual Visit was conducted with patient's (and/or legal guardian's) consent, to reduce the patient's risk of exposure to COVID-19 and provide necessary medical care. The patient (and/or legal guardian) has also been advised to contact this office for worsening conditions or problems, and seek emergency medical treatment and/or call 911 if deemed necessary. Services were provided through a video synchronous discussion virtually to substitute for in-person clinic visit. I will given 12 mg Suboxone daily for 7 days  He has not been here since July I will see him here in a week  --Noni Zacarias MD on 9/16/2020 at 3:57 PM    An electronic signature was used to authenticate this note.

## 2020-09-15 ENCOUNTER — TELEPHONE (OUTPATIENT)
Dept: INTERNAL MEDICINE CLINIC | Age: 30
End: 2020-09-15

## 2020-09-15 NOTE — TELEPHONE ENCOUNTER
LPN called in script of Suboxone 8mg film 1.5 films daily for 7 days qty 11 [facility] in [place], [place].

## 2020-09-21 ENCOUNTER — OFFICE VISIT (OUTPATIENT)
Dept: INTERNAL MEDICINE CLINIC | Age: 30
End: 2020-09-21
Payer: MEDICARE

## 2020-09-21 VITALS
HEART RATE: 101 BPM | WEIGHT: 227 LBS | SYSTOLIC BLOOD PRESSURE: 173 MMHG | BODY MASS INDEX: 31.78 KG/M2 | HEIGHT: 71 IN | TEMPERATURE: 98.8 F | DIASTOLIC BLOOD PRESSURE: 105 MMHG

## 2020-09-21 PROCEDURE — 99213 OFFICE O/P EST LOW 20 MIN: CPT | Performed by: INTERNAL MEDICINE

## 2020-09-21 PROCEDURE — 4004F PT TOBACCO SCREEN RCVD TLK: CPT | Performed by: INTERNAL MEDICINE

## 2020-09-21 PROCEDURE — G8428 CUR MEDS NOT DOCUMENT: HCPCS | Performed by: INTERNAL MEDICINE

## 2020-09-21 PROCEDURE — 80305 DRUG TEST PRSMV DIR OPT OBS: CPT | Performed by: INTERNAL MEDICINE

## 2020-09-21 PROCEDURE — G8417 CALC BMI ABV UP PARAM F/U: HCPCS | Performed by: INTERNAL MEDICINE

## 2020-09-21 RX ORDER — BUPRENORPHINE AND NALOXONE 12; 3 MG/1; MG/1
1 FILM, SOLUBLE BUCCAL; SUBLINGUAL DAILY
Qty: 14 FILM | Refills: 0 | Status: SHIPPED | OUTPATIENT
Start: 2020-09-21 | End: 2020-10-13 | Stop reason: SDUPTHER

## 2020-09-21 NOTE — PROGRESS NOTES
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 General: Patient resting comfortably in no acute distress, he is sitting still     Mental status: Alert and oriented to person place and time, no hallucinations or delusions     Eyes: Pupils are mildly dilated           URINE DRUG SCREEN TODAY:  No results for input(s): Caitlin Gamez, LABBENZ, BUPRENUR, COCAIMETSCRU, GABAPENTIN, MDMA, METAMPU, LABMETH, OPIATESCREENURINE, OXTCOSU, PHENCYCLIDINESCREENURINE, PROPOXYPHENE, THCSCREENUR, TRICYUR in the last 72 hours. Negative for fentanyl     Diagnosis Orders   1. Severe opioid use disorder (HCC)  POCT Rapid Drug Screen    buprenorphine-naloxone (SUBOXONE) 12-3 MG sublingual film   2.  Encounter for monitoring Suboxone maintenance therapy           PLAN:  He is on 12 mg daily  He is not experiencing any withdrawal  no side effects from the Suboxone  Urine is positive for oxycodone  Patient denies using any he did use a friend's Neurontin  I reviewed the [place] Automated Rx Reporting System report     There does not appear to be any discrepancies or overprescribing of controlled substances  He starts building another grain bin in [facility] tomorrow  We will send comprehensive urine  Follow-up  14 days

## 2020-09-21 NOTE — PROGRESS NOTES
Verbal order per Dr. Chinedu Ramos for urine drug screen. Positive for BUP OXY. Verified results with Jose Watts RN. Dr. Chinedu Ramos ordered Suboxone 12mg film daily  for patient. Verified dose with patient. Patient was sent home with 2 week script of Suboxone 12mg film daily and will be seen back in the office 10/5/20.

## 2020-10-13 ENCOUNTER — OFFICE VISIT (OUTPATIENT)
Dept: INTERNAL MEDICINE CLINIC | Age: 30
End: 2020-10-13
Payer: MEDICARE

## 2020-10-13 VITALS
DIASTOLIC BLOOD PRESSURE: 90 MMHG | HEIGHT: 71 IN | WEIGHT: 227 LBS | TEMPERATURE: 98.6 F | HEART RATE: 85 BPM | BODY MASS INDEX: 31.78 KG/M2 | SYSTOLIC BLOOD PRESSURE: 155 MMHG

## 2020-10-13 PROCEDURE — G8417 CALC BMI ABV UP PARAM F/U: HCPCS | Performed by: INTERNAL MEDICINE

## 2020-10-13 PROCEDURE — G8484 FLU IMMUNIZE NO ADMIN: HCPCS | Performed by: INTERNAL MEDICINE

## 2020-10-13 PROCEDURE — 99213 OFFICE O/P EST LOW 20 MIN: CPT | Performed by: INTERNAL MEDICINE

## 2020-10-13 PROCEDURE — 80305 DRUG TEST PRSMV DIR OPT OBS: CPT | Performed by: INTERNAL MEDICINE

## 2020-10-13 PROCEDURE — G8427 DOCREV CUR MEDS BY ELIG CLIN: HCPCS | Performed by: INTERNAL MEDICINE

## 2020-10-13 PROCEDURE — 4004F PT TOBACCO SCREEN RCVD TLK: CPT | Performed by: INTERNAL MEDICINE

## 2020-10-13 RX ORDER — BUPRENORPHINE AND NALOXONE 12; 3 MG/1; MG/1
1 FILM, SOLUBLE BUCCAL; SUBLINGUAL DAILY
Qty: 7 FILM | Refills: 0 | Status: SHIPPED | OUTPATIENT
Start: 2020-10-13 | End: 2020-10-20

## 2020-10-13 NOTE — PROGRESS NOTES
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 a job in [place]   he ran out of Suboxone a week ago  He said he came right to my office because if he would have went home he would have relapsed but he did not relapse  He said he went through a rather significant withdrawal  Past Medical History:   Diagnosis Date    Anxiety     Back pain        ROS     General: Patient is feeling well  Patient is not experiencing  withdrawal symptoms ,no urges or cravings  Patient is not having any side effects from the buprenorphine    PHYSICAL EXAM     Blood pressure (!) 155/90, pulse 85, temperature 98.6 °F (37 °C), height 5' 11\" (1.803 m), weight 227 lb (103 kg).          General: Patient resting comfortably in no acute distress, he is sitting still     Mental status: Alert and oriented to person place and time, no hallucinations or delusions     Eyes: Pupils are mildly dilated           URINE DRUG SCREEN TODAY:     Alcohol, Urine  10/13/2020 12:55 PM  Unknown    NEG    Amphetamine Screen, Urine  10/13/2020 12:55 PM  Unknown    NEG    Barbiturate Screen, Urine  10/13/2020 12:55 PM  Unknown    NEG    Benzodiazepine Screen, Urine  10/13/2020 12:55 PM  Unknown    NEG    Buprenorphine Urine  10/13/2020 12:55 PM  Unknown    NEG    Cocaine Metabolite Screen, Urine  10/13/2020 12:55 PM  Unknown    NEG    FENTANYL SCREEN, URINE  10/13/2020 12:55 PM  Unknown    NEG    Gabapentin Screen, Urine 10/13/2020 12:55 PM  Unknown    N/A    MDMA, Urine  10/13/2020 12:55 PM  Unknown    NEG    Methadone Screen, Urine  10/13/2020 12:55 PM  Unknown    NEG    Methamphetamine, Urine  10/13/2020 12:55 PM  Unknown    NEG    Opiate Scrn, Ur  10/13/2020 12:55 PM  Unknown    NEG    Oxycodone Screen, Ur  10/13/2020 12:55 PM  Unknown    NEG    PCP Screen, Urine  10/13/2020 12:55 PM  Unknown    NEG    Propoxyphene Screen, Urine  10/13/2020 12:55 PM  Unknown    N/A    Synthetic Cannabinoids (K2) Screen, Urine  10/13/2020 12:55 PM  Unknown    NEG    THC Screen, Urine  10/13/2020 12:55 PM  Unknown    NEG    Tramadol Scrn, Ur  10/13/2020 12:55 PM  Unknown    NEG    Tricyclic Antidepressants, Urine  10/13/2020 12:55 PM  Unknown    N/A             Diagnosis Orders   1. Severe opioid use disorder (HCC)  POCT Rapid Drug Screen    buprenorphine-naloxone (SUBOXONE) 12-3 MG sublingual film   2.  Encounter for monitoring Suboxone maintenance therapy           PLAN:  He is on 12 mg daily  We will give him 1 out of stock to use immediately  I am going to send in 7 days worth of 12 mg films  He is going back to do grain bins in [address] [place] [address] a virtual visit in a week      I reviewed the [place] Automated Rx Reporting System report     There does not appear to be any discrepancies or overprescribing of controlled substances

## 2020-10-13 NOTE — PROGRESS NOTES
Verbal order per Dr. Arturo Cee for urine drug screen. Negative for all drugs. Verified results with Randi HEARD Rn.    Dr. Arturo Cee ordered Suboxone 12mg film daily for patient. Verified dose with patient. Patient was sent home with 1 week script of Suboxone 12mg film daily and Suboxone 12mg film qty 1 out of stock and will be seen back in the office 10/20/20.

## 2020-10-20 ENCOUNTER — VIRTUAL VISIT (OUTPATIENT)
Dept: INTERNAL MEDICINE CLINIC | Age: 30
End: 2020-10-20
Payer: MEDICARE

## 2020-10-20 PROCEDURE — G8484 FLU IMMUNIZE NO ADMIN: HCPCS | Performed by: INTERNAL MEDICINE

## 2020-10-20 PROCEDURE — G8428 CUR MEDS NOT DOCUMENT: HCPCS | Performed by: INTERNAL MEDICINE

## 2020-10-20 PROCEDURE — G8417 CALC BMI ABV UP PARAM F/U: HCPCS | Performed by: INTERNAL MEDICINE

## 2020-10-20 PROCEDURE — 99213 OFFICE O/P EST LOW 20 MIN: CPT | Performed by: INTERNAL MEDICINE

## 2020-10-20 PROCEDURE — 4004F PT TOBACCO SCREEN RCVD TLK: CPT | Performed by: INTERNAL MEDICINE

## 2020-10-20 RX ORDER — BUPRENORPHINE AND NALOXONE 12; 3 MG/1; MG/1
1 FILM, SOLUBLE BUCCAL; SUBLINGUAL DAILY
Qty: 7 FILM | Refills: 0 | Status: CANCELLED | OUTPATIENT
Start: 2020-10-20 | End: 2020-10-27

## 2020-10-20 RX ORDER — BUPRENORPHINE AND NALOXONE 8; 2 MG/1; MG/1
1.5 FILM, SOLUBLE BUCCAL; SUBLINGUAL DAILY
Qty: 21 FILM | Refills: 0 | Status: SHIPPED | OUTPATIENT
Start: 2020-10-20 | End: 2020-11-18 | Stop reason: SDUPTHER

## 2020-10-20 NOTE — PROGRESS NOTES
10/20/2020    TELEHEALTH EVALUATION -- Audio/Visual (During YKJJB-45 public health emergency)    HPI:  A video conference was done with the patient  Patient was at home, I was in the office  There was no  one else  present during the conference    Leighann Crum (:  1990) has requested an audio/video evaluation for the following concern(s):    HPI  I saw him here for the first time   (), last time 10/15  We had a video visit      He is referred here by his stepsister who is a patient of mine  Patient has had problems using pain pills, heroin  Patient started using pain pills7 years ago  A doctor initially prescribed them for a back injury  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 Authorizing Provider   buprenorphine-naloxone (SUBOXONE) 8-2 MG FILM SL film Place 1.5 Film under the tongue daily for 14 days. Yes Nu Reyes MD   buprenorphine-naloxone (SUBOXONE) 12-3 MG sublingual film Place 1 Film under the tongue daily for 7 days. Nu Reyes MD       Social History     Tobacco Use    Smoking status: Current Every Day Smoker     Packs/day: 0.50     Types: Cigarettes    Smokeless tobacco: Never Used   Substance Use Topics    Alcohol use: No    Drug use: Yes     Types: Opiates , Marijuana     Comment: last used vicodin 3/17/2020.  last used heroin/fentanyl 3/15/2020            PHYSICAL EXAMINATION:  [ INSTRUCTIONS:  \"[x]\" Indicates a positive item  \"[]\" Indicates a negative item  -- DELETE ALL ITEMS NOT EXAMINED]  No vitals done    Constitutional: [x] Appears well-developed and well-nourished [x] No apparent distress      [] Abnormal-   Mental status  [x] Alert and awake  [x] Oriented to person/place/time [x]Able to follow commands      Eyes:  EOM    [x]  Normal  [] Abnormal-  Sclera  []  Normal  [] Abnormal -         Discharge []  None visible  [] Abnormal -  Pupils normal           Psychiatric:       [x] Normal Affect [] No Hallucinations        [] Abnormal-        Diagnosis Orders   1. Severe opioid use disorder (HCC)  buprenorphine-naloxone (SUBOXONE) 8-2 MG FILM SL film   2. Encounter for monitoring Suboxone maintenance therapy           Katerin Pastor is a 31 y.o. male being evaluated by a Virtual Visit (video visit) encounter to address concerns as mentioned above. A caregiver was present when appropriate. Due to this being a TeleHealth encounter (During [facility]-01 public health emergency), evaluation of the following organ systems was limited: Vitals/Constitutional/EENT/Resp/CV/GI//MS/Neuro/Skin/Heme-Lymph-Imm. Pursuant to the emergency declaration under the [address] [place] [address] and the [employer] and [employer] Act, this Virtual Visit was conducted with patient's (and/or legal guardian's) consent, to reduce the patient's risk of exposure to COVID-19 and provide necessary medical care. The patient (and/or legal guardian) has also been advised to contact this office for worsening conditions or problems, and seek emergency medical treatment and/or call 911 if deemed necessary. Services were provided through a video synchronous discussion virtually to substitute for in-person clinic visit. He is doing well  Follow-up here in the office in 2 weeks  --Marichuy Negrete MD on 10/20/2020 at 5:38 PM    An electronic signature was used to authenticate this note.

## 2020-11-04 ENCOUNTER — OFFICE VISIT (OUTPATIENT)
Dept: INTERNAL MEDICINE CLINIC | Age: 30
End: 2020-11-04
Payer: MEDICARE

## 2020-11-04 VITALS
TEMPERATURE: 98.1 F | BODY MASS INDEX: 31.5 KG/M2 | WEIGHT: 225 LBS | SYSTOLIC BLOOD PRESSURE: 106 MMHG | HEIGHT: 71 IN | HEART RATE: 57 BPM | DIASTOLIC BLOOD PRESSURE: 67 MMHG

## 2020-11-04 LAB
ALCOHOL URINE: ABNORMAL
AMPHETAMINE SCREEN, URINE: ABNORMAL
BARBITURATE SCREEN, URINE: ABNORMAL
BENZODIAZEPINE SCREEN, URINE: ABNORMAL
BUPRENORPHINE URINE: ABNORMAL
COCAINE METABOLITE SCREEN URINE: ABNORMAL
FENTANYL SCREEN, URINE: ABNORMAL
GABAPENTIN SCREEN, URINE: ABNORMAL
MDMA URINE: ABNORMAL
METHADONE SCREEN, URINE: ABNORMAL
METHAMPHETAMINE, URINE: ABNORMAL
OPIATE SCREEN URINE: ABNORMAL
OXYCODONE SCREEN URINE: ABNORMAL
PHENCYCLIDINE SCREEN URINE: ABNORMAL
PROPOXYPHENE SCREEN, URINE: ABNORMAL
SYNTHETIC CANNABINOIDS(K2) SCREEN, URINE: ABNORMAL
THC SCREEN, URINE: ABNORMAL
TRAMADOL SCREEN URINE: ABNORMAL
TRICYCLIC ANTIDEPRESSANTS, UR: ABNORMAL

## 2020-11-04 PROCEDURE — 80305 DRUG TEST PRSMV DIR OPT OBS: CPT | Performed by: INTERNAL MEDICINE

## 2020-11-04 PROCEDURE — G8428 CUR MEDS NOT DOCUMENT: HCPCS | Performed by: INTERNAL MEDICINE

## 2020-11-04 PROCEDURE — 99213 OFFICE O/P EST LOW 20 MIN: CPT | Performed by: INTERNAL MEDICINE

## 2020-11-04 PROCEDURE — 4004F PT TOBACCO SCREEN RCVD TLK: CPT | Performed by: INTERNAL MEDICINE

## 2020-11-04 PROCEDURE — G8484 FLU IMMUNIZE NO ADMIN: HCPCS | Performed by: INTERNAL MEDICINE

## 2020-11-04 PROCEDURE — G8417 CALC BMI ABV UP PARAM F/U: HCPCS | Performed by: INTERNAL MEDICINE

## 2020-11-04 RX ORDER — BUPRENORPHINE AND NALOXONE 12; 3 MG/1; MG/1
1 FILM, SOLUBLE BUCCAL; SUBLINGUAL DAILY
COMMUNITY
End: 2020-11-04 | Stop reason: SDUPTHER

## 2020-11-05 RX ORDER — BUPRENORPHINE AND NALOXONE 12; 3 MG/1; MG/1
1 FILM, SOLUBLE BUCCAL; SUBLINGUAL DAILY
Qty: 7 FILM | Refills: 0 | Status: SHIPPED | OUTPATIENT
Start: 2020-11-05 | End: 2020-11-12

## 2020-11-07 NOTE — PROGRESS NOTES
Verbal order per Dr. Edward Villegas for urine drug screen. Positive for BUP METH. Verified results with Dipti Pandya MA. Dr. Edward Villegas ordered Suboxone 12mg film daily for patient. Verified dose with patient. Patient was sent home with 1 week script of Suboxone 12mg film daily and will be seen back in the office 11/11/20. UC sent.
URINE  11/04/2020  2:35 PM  Unknown    NEG    Gabapentin Screen, Urine  11/04/2020  2:35 PM  Unknown    N/A    MDMA, Urine  11/04/2020  2:35 PM  Unknown    NEG    Methadone Screen, Urine  11/04/2020  2:35 PM  Unknown    NEG    Methamphetamine, Urine  11/04/2020  2:35 PM  Unknown    POS    Opiate Scrn, Ur  11/04/2020  2:35 PM  Unknown    NEG    Oxycodone Screen, Ur  11/04/2020  2:35 PM  Unknown    NEG    PCP Screen, Urine  11/04/2020  2:35 PM  Unknown    NEG    Propoxyphene Screen, Urine  11/04/2020  2:35 PM  Unknown    N/A    Synthetic Cannabinoids (K2) Screen, Urine  11/04/2020  2:35 PM  Unknown    NEG    THC Screen, Urine  11/04/2020  2:35 PM  Unknown    NEG    Tramadol Scrn, Ur  11/04/2020  2:35 PM  Unknown    NEG    Tricyclic Antidepressants, Urine  11/04/2020  2:35 PM  Unknown    N/A              Diagnosis Orders   1. Severe opioid use disorder (HCC)  POCT Rapid Drug Screen    buprenorphine-naloxone (SUBOXONE) 12-3 MG sublingual film   2. Encounter for monitoring Suboxone maintenance therapy     3.  Polysubstance abuse ([facility] [address]           PLAN:  He is on 12 mg daily  Urine today has buprenorphine and meth  Cannot really explain his sweaty incoherent etc. we will send comprehensive urine      I reviewed the [place] Automated Rx Reporting System report     There does not appear to be any discrepancies or overprescribing of controlled substances  Follow-up 1 week

## 2020-11-10 ENCOUNTER — TELEPHONE (OUTPATIENT)
Dept: INTERNAL MEDICINE CLINIC | Age: 30
End: 2020-11-10

## 2020-11-10 NOTE — TELEPHONE ENCOUNTER
VO per Dr. Raines Chamber for Suboxone 12mg film daily for 7 days qty 7. LPN called in script of Suboxone 12mg film daily for 7 days qty 7 into [facility] in [place], [place].

## 2020-11-11 ENCOUNTER — TELEPHONE (OUTPATIENT)
Dept: INTERNAL MEDICINE CLINIC | Age: 30
End: 2020-11-11

## 2020-11-11 NOTE — TELEPHONE ENCOUNTER
LPN called and left VM informing patient that [facility] in [facility], [address] [place] [address].

## 2020-11-18 ENCOUNTER — OFFICE VISIT (OUTPATIENT)
Dept: INTERNAL MEDICINE CLINIC | Age: 30
End: 2020-11-18
Payer: MEDICARE

## 2020-11-18 VITALS
HEART RATE: 133 BPM | HEIGHT: 71 IN | BODY MASS INDEX: 29.96 KG/M2 | WEIGHT: 214 LBS | TEMPERATURE: 98 F | DIASTOLIC BLOOD PRESSURE: 99 MMHG | SYSTOLIC BLOOD PRESSURE: 171 MMHG

## 2020-11-18 PROCEDURE — G8417 CALC BMI ABV UP PARAM F/U: HCPCS | Performed by: INTERNAL MEDICINE

## 2020-11-18 PROCEDURE — 4004F PT TOBACCO SCREEN RCVD TLK: CPT | Performed by: INTERNAL MEDICINE

## 2020-11-18 PROCEDURE — 99213 OFFICE O/P EST LOW 20 MIN: CPT | Performed by: INTERNAL MEDICINE

## 2020-11-18 PROCEDURE — G8427 DOCREV CUR MEDS BY ELIG CLIN: HCPCS | Performed by: INTERNAL MEDICINE

## 2020-11-18 PROCEDURE — 80305 DRUG TEST PRSMV DIR OPT OBS: CPT | Performed by: INTERNAL MEDICINE

## 2020-11-18 PROCEDURE — G8484 FLU IMMUNIZE NO ADMIN: HCPCS | Performed by: INTERNAL MEDICINE

## 2020-11-18 RX ORDER — BUPRENORPHINE AND NALOXONE 8; 2 MG/1; MG/1
1 FILM, SOLUBLE BUCCAL; SUBLINGUAL 2 TIMES DAILY
Qty: 10 FILM | Refills: 0 | OUTPATIENT
Start: 2020-11-18 | End: 2020-11-23 | Stop reason: SDUPTHER

## 2020-11-18 RX ORDER — BUPRENORPHINE AND NALOXONE 8; 2 MG/1; MG/1
1.5 FILM, SOLUBLE BUCCAL; SUBLINGUAL 2 TIMES DAILY
Qty: 15 FILM | Refills: 0 | Status: SHIPPED | OUTPATIENT
Start: 2020-11-18 | End: 2020-11-18

## 2020-11-18 RX ORDER — BUPRENORPHINE AND NALOXONE 12; 3 MG/1; MG/1
1 FILM, SOLUBLE BUCCAL; SUBLINGUAL DAILY
Qty: 7 FILM | Refills: 0 | Status: CANCELLED | OUTPATIENT
Start: 2020-11-18 | End: 2020-11-25

## 2020-11-18 NOTE — PROGRESS NOTES
Verbal order per Dr. Shiv Daly for urine drug screen. Positive for BUP, BZO, AMP, MET, FENTANYL, THC. Verified results with Guillermo Bañuelos LPN. Dr. Shiv Daly ordered Suboxone 8 mg film BID for patient. Verified dose with patient. Patient was sent home with 5 day script for Suboxone 8 mg film BID and will be seen back in the office on 11/23/20.

## 2020-11-18 NOTE — PROGRESS NOTES
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 [place]   Apparently in the room the nurse tried to arouse him he was sweaty incoherent  She thought she was going to have to use Narcan  When I came and he was alert but somewhat confused  He denies using  Says he has to get back to work  Past Medical History:   Diagnosis Date    Anxiety     Back pain        ROS     General: Patient is feeling well  Patient is not experiencing  withdrawal symptoms ,no urges or cravings  Patient is not having any side effects from the buprenorphine    PHYSICAL EXAM     Blood pressure (!) 171/99, pulse 133, temperature 98 °F (36.7 °C), height 5' 11\" (1.803 m), weight 214 lb (97.1 kg).          General: Patient resting comfortably in no acute distress, he is sitting still     Mental status: Alert and oriented to person place and time, no hallucinations or delusions     Eyes: Pupils are mildly dilated           URINE DRUG SCREEN TODAY:  Alcohol, Urine  11/18/2020 11:00 AM  Unknown    NEG    Amphetamine Screen, Urine  11/18/2020 11:00 AM  Unknown    POS    Barbiturate Screen, Urine  11/18/2020 11:00 AM  Unknown    NEG    Benzodiazepine Screen, Urine  11/18/2020 11:00 AM  Unknown    POS    Buprenorphine Urine  11/18/2020 11:00 AM  Unknown    POS    Cocaine Metabolite Screen, Urine  11/18/2020 11:00 AM  Unknown    NEG    FENTANYL SCREEN, URINE 11/18/2020 11:00 AM  Unknown    POS    Gabapentin Screen, Urine  11/18/2020 11:00 AM  Unknown    N/A    MDMA, Urine  11/18/2020 11:00 AM  Unknown    NEG    Methadone Screen, Urine  11/18/2020 11:00 AM  Unknown    NEG    Methamphetamine, Urine  11/18/2020 11:00 AM  Unknown    POS    Opiate Scrn, Ur  11/18/2020 11:00 AM  Unknown    NEG    Oxycodone Screen, Ur  11/18/2020 11:00 AM  Unknown    NEG    PCP Screen, Urine  11/18/2020 11:00 AM  Unknown    NEG    Propoxyphene Screen, Urine  11/18/2020 11:00 AM  Unknown    N/A    Synthetic Cannabinoids (K2) Screen, Urine  11/18/2020 11:00 AM  Unknown    NEG    THC Screen, Urine  11/18/2020 11:00 AM  Unknown    POS    Tramadol Scrn, Ur  11/18/2020 11:00 AM  Unknown    NEG    Tricyclic Antidepressants, Urine  11/18/2020 11         Diagnosis Orders   1. Severe opioid use disorder (HCC)  POCT Rapid Drug Screen    DISCONTINUED: buprenorphine-naloxone (SUBOXONE) 8-2 MG FILM SL film    DISCONTINUED: buprenorphine-naloxone (SUBOXONE) 8-2 MG FILM SL film   2. Encounter for monitoring Suboxone maintenance therapy     3.  Polysubstance abuse ([facility] [address]           PLAN:  He is on 12 mg daily  Urine today has buprenorphine and meth and benzos  Comprehensive urine from 11/4 showed meth  He said he has changed jobs and has not used recently    I reviewed the [place] Automated Rx Reporting System report     There does not appear to be any discrepancies or overprescribing of controlled substances  Follow-up 5 days

## 2020-11-23 ENCOUNTER — OFFICE VISIT (OUTPATIENT)
Dept: INTERNAL MEDICINE CLINIC | Age: 30
End: 2020-11-23
Payer: MEDICARE

## 2020-11-23 VITALS
TEMPERATURE: 98.1 F | BODY MASS INDEX: 30.1 KG/M2 | DIASTOLIC BLOOD PRESSURE: 73 MMHG | HEIGHT: 71 IN | SYSTOLIC BLOOD PRESSURE: 179 MMHG | WEIGHT: 215 LBS | HEART RATE: 80 BPM

## 2020-11-23 PROCEDURE — 4004F PT TOBACCO SCREEN RCVD TLK: CPT | Performed by: INTERNAL MEDICINE

## 2020-11-23 PROCEDURE — G8427 DOCREV CUR MEDS BY ELIG CLIN: HCPCS | Performed by: INTERNAL MEDICINE

## 2020-11-23 PROCEDURE — 80305 DRUG TEST PRSMV DIR OPT OBS: CPT | Performed by: INTERNAL MEDICINE

## 2020-11-23 PROCEDURE — 99213 OFFICE O/P EST LOW 20 MIN: CPT | Performed by: INTERNAL MEDICINE

## 2020-11-23 PROCEDURE — G8417 CALC BMI ABV UP PARAM F/U: HCPCS | Performed by: INTERNAL MEDICINE

## 2020-11-23 PROCEDURE — G8484 FLU IMMUNIZE NO ADMIN: HCPCS | Performed by: INTERNAL MEDICINE

## 2020-11-23 RX ORDER — BUPRENORPHINE AND NALOXONE 8; 2 MG/1; MG/1
1 FILM, SOLUBLE BUCCAL; SUBLINGUAL 2 TIMES DAILY
Qty: 14 FILM | Refills: 0 | Status: SHIPPED | OUTPATIENT
Start: 2020-11-23 | End: 2020-11-30 | Stop reason: SDUPTHER

## 2020-11-23 NOTE — PROGRESS NOTES
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 [place]   Apparently in the room the nurse tried to arouse him he was sweaty incoherent  She thought she was going to have to use Narcan  When I came and he was alert but somewhat confused  He denies using  Says he has to get back to work  Past Medical History:   Diagnosis Date    Anxiety     Back pain        ROS     General: Patient is feeling well  Patient is not experiencing  withdrawal symptoms ,no urges or cravings  Patient is not having any side effects from the buprenorphine    PHYSICAL EXAM     Blood pressure (!) 179/73, pulse 80, temperature 98.1 °F (36.7 °C), height 5' 11\" (1.803 m), weight 215 lb (97.5 kg).          General: Patient resting comfortably in no acute distress, he is sitting still     Mental status: Alert and oriented to person place and time, no hallucinations or delusions     Eyes: Pupils are mildly dilated           URINE DRUG SCREEN TODAY:   Alcohol, Urine  11/23/2020 10:05 AM  Unknown    NEG    Amphetamine Screen, Urine  11/23/2020 10:05 AM  Unknown    NEG    Barbiturate Screen, Urine  11/23/2020 10:05 AM  Unknown    NEG    Benzodiazepine Screen, Urine  11/23/2020 10:05 AM  Unknown    NEG    Buprenorphine Urine  11/23/2020 10:05 AM  Unknown    POS    Cocaine Metabolite Screen, Urine 11/23/2020 10:05 AM  Unknown    NEG    FENTANYL SCREEN, URINE  11/23/2020 10:05 AM  Unknown    NEG    Gabapentin Screen, Urine  11/23/2020 10:05 AM  Unknown    N/A    MDMA, Urine  11/23/2020 10:05 AM  Unknown    NEG    Methadone Screen, Urine  11/23/2020 10:05 AM  Unknown    NEG    Methamphetamine, Urine  11/23/2020 10:05 AM  Unknown    NEG    Opiate Scrn, Ur  11/23/2020 10:05 AM  Unknown    NEG    Oxycodone Screen, Ur  11/23/2020 10:05 AM  Unknown    NEG    PCP Screen, Urine  11/23/2020 10:05 AM  Unknown    NEG    Propoxyphene Screen, Urine  11/23/2020 10:05 AM  Unknown    N/A    Synthetic Cannabinoids (K2) Screen, Urine  11/23/2020 10:05 AM  Unknown    NEG    THC Screen, Urine  11/23/2020 10:05 AM  Unknown    POS    Tramadol Scrn, Ur  11/23/2020 10:05 AM  Unknown    NEG    Tricyclic Antidepressants, Urine  11/23/2020 10:05 AM  Unknown    N/A           Diagnosis Orders   1. Severe opioid use disorder (HCC)  POCT Rapid Drug Screen    buprenorphine-naloxone (SUBOXONE) 8-2 MG FILM SL film   2. Encounter for monitoring Suboxone maintenance therapy     3.  Polysubstance abuse ([facility] [address]           PLAN:  He is on 16 mg daily  Urine today has buprenorphine and THC  He says he is done doing meth and opiates      I reviewed the [place] Automated Rx Reporting System report     There does not appear to be any discrepancies or overprescribing of controlled substances  Follow-up 1 week

## 2020-11-23 NOTE — PROGRESS NOTES
Verbal order per Dr. Juliocesar Ziegler for urine drug screen. Positive for BUP THC. Verified results with Kavitha Burrell. Dr. Juliocesar Ziegler ordered Suboxone 8mg film BID  for patient. Verified dose with patient. Patient was sent home with 1 week script of Suboxone 8mg film BID and will be seen back in the office 11/30/20. UC and Oral swab sent.

## 2020-11-30 ENCOUNTER — OFFICE VISIT (OUTPATIENT)
Dept: INTERNAL MEDICINE CLINIC | Age: 30
End: 2020-11-30
Payer: MEDICARE

## 2020-11-30 VITALS
TEMPERATURE: 98.1 F | BODY MASS INDEX: 30.24 KG/M2 | DIASTOLIC BLOOD PRESSURE: 91 MMHG | HEIGHT: 71 IN | WEIGHT: 216 LBS | HEART RATE: 87 BPM | SYSTOLIC BLOOD PRESSURE: 141 MMHG

## 2020-11-30 PROCEDURE — 99213 OFFICE O/P EST LOW 20 MIN: CPT | Performed by: INTERNAL MEDICINE

## 2020-11-30 PROCEDURE — G8484 FLU IMMUNIZE NO ADMIN: HCPCS | Performed by: INTERNAL MEDICINE

## 2020-11-30 PROCEDURE — G8427 DOCREV CUR MEDS BY ELIG CLIN: HCPCS | Performed by: INTERNAL MEDICINE

## 2020-11-30 PROCEDURE — 4004F PT TOBACCO SCREEN RCVD TLK: CPT | Performed by: INTERNAL MEDICINE

## 2020-11-30 PROCEDURE — 80305 DRUG TEST PRSMV DIR OPT OBS: CPT | Performed by: INTERNAL MEDICINE

## 2020-11-30 PROCEDURE — G8417 CALC BMI ABV UP PARAM F/U: HCPCS | Performed by: INTERNAL MEDICINE

## 2020-11-30 RX ORDER — BUPRENORPHINE AND NALOXONE 8; 2 MG/1; MG/1
1 FILM, SOLUBLE BUCCAL; SUBLINGUAL 2 TIMES DAILY
Qty: 14 FILM | Refills: 0 | Status: SHIPPED | OUTPATIENT
Start: 2020-11-30 | End: 2020-12-15 | Stop reason: SDUPTHER

## 2020-12-07 ENCOUNTER — TELEPHONE (OUTPATIENT)
Dept: INTERNAL MEDICINE CLINIC | Age: 30
End: 2020-12-07

## 2020-12-07 NOTE — TELEPHONE ENCOUNTER
Pt called stated \"baby momma\" is requiring pt to being his daughter to [place] today or else she will be calling the  on him for kidnapping even though he says he has had her for 47 days straight. He has to do a saliva test to prove that she is his biological daughter and when that test proves she is his, they will have equal rights to custody which will mean they switch off custody every 30 days. Pt is missing work today at [employer] in [place], [place] to take his daughter back down to [place] today. Pt requesting appt be changed to Wednesday 12/9 and pt has enough medication due to only taking half a film each day. Pt not requesting more medication at this time, just an appt r/s. RN to give Dr. Milton Sadler information.

## 2020-12-09 ENCOUNTER — TELEPHONE (OUTPATIENT)
Dept: INTERNAL MEDICINE CLINIC | Age: 30
End: 2020-12-09

## 2020-12-09 NOTE — TELEPHONE ENCOUNTER
RN called pt to see if they would be coming to their appt that he missed at 4P. Message left to call back today or tomorrow to r/s.

## 2020-12-15 ENCOUNTER — OFFICE VISIT (OUTPATIENT)
Dept: INTERNAL MEDICINE CLINIC | Age: 30
End: 2020-12-15
Payer: MEDICARE

## 2020-12-15 VITALS
DIASTOLIC BLOOD PRESSURE: 104 MMHG | HEIGHT: 71 IN | WEIGHT: 216 LBS | TEMPERATURE: 97.3 F | SYSTOLIC BLOOD PRESSURE: 167 MMHG | BODY MASS INDEX: 30.24 KG/M2 | HEART RATE: 87 BPM

## 2020-12-15 PROCEDURE — G8417 CALC BMI ABV UP PARAM F/U: HCPCS | Performed by: INTERNAL MEDICINE

## 2020-12-15 PROCEDURE — 99213 OFFICE O/P EST LOW 20 MIN: CPT | Performed by: INTERNAL MEDICINE

## 2020-12-15 PROCEDURE — G8484 FLU IMMUNIZE NO ADMIN: HCPCS | Performed by: INTERNAL MEDICINE

## 2020-12-15 PROCEDURE — G8427 DOCREV CUR MEDS BY ELIG CLIN: HCPCS | Performed by: INTERNAL MEDICINE

## 2020-12-15 PROCEDURE — 80305 DRUG TEST PRSMV DIR OPT OBS: CPT | Performed by: INTERNAL MEDICINE

## 2020-12-15 PROCEDURE — 4004F PT TOBACCO SCREEN RCVD TLK: CPT | Performed by: INTERNAL MEDICINE

## 2020-12-15 RX ORDER — BUPRENORPHINE AND NALOXONE 8; 2 MG/1; MG/1
1 FILM, SOLUBLE BUCCAL; SUBLINGUAL DAILY
Status: DISCONTINUED | OUTPATIENT
Start: 2020-12-15 | End: 2020-12-28

## 2020-12-15 RX ADMIN — BUPRENORPHINE AND NALOXONE 1 FILM: 8; 2 FILM, SOLUBLE BUCCAL; SUBLINGUAL at 11:02

## 2020-12-15 NOTE — PROGRESS NOTES
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 [employer] anymore starting 2 weeks from now    He is referred here by his bogdan who is a patient of mine  Patient has had problems using pain pills, heroin  Patient started using pain pills7 years ago  A doctor initially prescribed them for a back injury  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 [place]   Apparently in the room the nurse tried to arouse him he was sweaty incoherent  She thought she was going to have to use Narcan  When I came and he was alert but somewhat confused  He denies using    Past Medical History:   Diagnosis Date    Anxiety     Back pain        ROS     General: Patient is feeling well  Patient is not experiencing  withdrawal symptoms ,no urges or cravings  Patient is not having any side effects from the buprenorphine    PHYSICAL EXAM     Blood pressure (!) 167/104, pulse 87, temperature 97.3 °F (36.3 °C), height 5' 11\" (1.803 m), weight 216 lb (98 kg).          General: Patient resting comfortably in no acute distress, he is sitting still     Mental status: Alert and oriented to person place and time, no hallucinations or delusions     Eyes: Pupils are mildly dilated           URINE DRUG SCREEN TODAY:   Alcohol, Urine 12/15/2020  9:35 AM Unknown   NEG    Amphetamine Screen, Urine 12/15/2020  9:35 AM Unknown

## 2020-12-16 RX ORDER — BUPRENORPHINE AND NALOXONE 8; 2 MG/1; MG/1
1 FILM, SOLUBLE BUCCAL; SUBLINGUAL 2 TIMES DAILY
Qty: 14 FILM | Refills: 0 | Status: SHIPPED | OUTPATIENT
Start: 2020-12-16 | End: 2020-12-28 | Stop reason: SDUPTHER

## 2020-12-17 ENCOUNTER — TELEPHONE (OUTPATIENT)
Dept: INTERNAL MEDICINE CLINIC | Age: 30
End: 2020-12-17

## 2020-12-28 ENCOUNTER — OFFICE VISIT (OUTPATIENT)
Dept: INTERNAL MEDICINE CLINIC | Age: 30
End: 2020-12-28
Payer: MEDICARE

## 2020-12-28 VITALS
SYSTOLIC BLOOD PRESSURE: 102 MMHG | TEMPERATURE: 99.7 F | BODY MASS INDEX: 30.52 KG/M2 | DIASTOLIC BLOOD PRESSURE: 64 MMHG | WEIGHT: 218 LBS | HEART RATE: 117 BPM | HEIGHT: 71 IN

## 2020-12-28 DIAGNOSIS — Z51.81 ENCOUNTER FOR MONITORING SUBOXONE MAINTENANCE THERAPY: ICD-10-CM

## 2020-12-28 DIAGNOSIS — F11.20 SEVERE OPIOID USE DISORDER (HCC): Primary | ICD-10-CM

## 2020-12-28 DIAGNOSIS — F19.10 POLYSUBSTANCE ABUSE (HCC): ICD-10-CM

## 2020-12-28 DIAGNOSIS — Z79.899 ENCOUNTER FOR MONITORING SUBOXONE MAINTENANCE THERAPY: ICD-10-CM

## 2020-12-28 PROCEDURE — G8427 DOCREV CUR MEDS BY ELIG CLIN: HCPCS | Performed by: INTERNAL MEDICINE

## 2020-12-28 PROCEDURE — 4004F PT TOBACCO SCREEN RCVD TLK: CPT | Performed by: INTERNAL MEDICINE

## 2020-12-28 PROCEDURE — G8417 CALC BMI ABV UP PARAM F/U: HCPCS | Performed by: INTERNAL MEDICINE

## 2020-12-28 PROCEDURE — 99213 OFFICE O/P EST LOW 20 MIN: CPT | Performed by: INTERNAL MEDICINE

## 2020-12-28 PROCEDURE — G8484 FLU IMMUNIZE NO ADMIN: HCPCS | Performed by: INTERNAL MEDICINE

## 2020-12-28 PROCEDURE — 80305 DRUG TEST PRSMV DIR OPT OBS: CPT | Performed by: INTERNAL MEDICINE

## 2020-12-28 RX ORDER — BUPRENORPHINE AND NALOXONE 8; 2 MG/1; MG/1
1 FILM, SOLUBLE BUCCAL; SUBLINGUAL 2 TIMES DAILY
Qty: 14 FILM | Refills: 0 | Status: SHIPPED | OUTPATIENT
Start: 2020-12-28 | End: 2021-01-04

## 2020-12-28 NOTE — PROGRESS NOTES
Verbal order per Dr. Troy Russell for urine drug screen. Negative for all drugs. Verified results with Court bINOCENCIO Cohn. Dr. Troy Russell ordered Suboxone 8 mg film BID for patient. Verified dose with patient. Patient was sent home with 1 week script for Suboxone 8 mg film BID and Suboxone 8 mg film qty 1 and will be seen back in the office on 1/4/21. UC and oral swab sent.

## 2020-12-28 NOTE — PROGRESS NOTES
MEDICATION ASSISTED TREATMENT ENCOUNTER    HISTORY OF PRESENT ILLNESS  Patient presents for evaluation of opioid use and would like to be placed on medication assisted treatment  I saw him here for the first time   (March 18), last time 12/15  He missed an appointment last week but he says he is not taking any illicit drugs  No Suboxone off the street    He has been out of Suboxone for a week  Says he feels very antsy  His mother was murdered 2 years ago by someone injecting her with drugs  The murder trial begins January 12    He man referred here by his stepsister who is a patient of mine  Patient has had problems using pain pills, heroin  Patient started using pain pills7 years ago  A doctor initially prescribed them for a back injury  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          General: Patient resting comfortably in no acute distress, he is sitting still     Mental status: Alert and oriented to person place and time, no hallucinations or delusions     Eyes: Pupils are mildly dilated           URINE DRUG SCREEN TODAY:     Alcohol, Urine 12/28/2020  2:00 PM Unknown   NEG    Amphetamine Screen, Urine 12/28/2020  2:00 PM Unknown   NEG    Barbiturate Screen, Urine 12/28/2020  2:00 PM Unknown   NEG    Benzodiazepine Screen, Urine 12/28/2020  2:00 PM Unknown   NEG    Buprenorphine Urine 12/28/2020  2:00 PM Unknown   NEG    Cocaine Metabolite Screen, Urine 12/28/2020  2:00 PM Unknown   NEG    FENTANYL SCREEN, URINE 12/28/2020  2:00 PM Unknown   NEG    Gabapentin Screen, Urine 12/28/2020  2:00 PM Unknown   N/A    MDMA, Urine 12/28/2020  2:00 PM Unknown   NEG    Methadone Screen, Urine 12/28/2020  2:00 PM Unknown   NEG    Methamphetamine, Urine 12/28/2020  2:00 PM Unknown   NEG    Opiate Scrn, Ur 12/28/2020  2:00 PM Unknown   NEG    Oxycodone Screen, Ur 12/28/2020  2:00 PM Unknown   NEG    PCP Screen, Urine 12/28/2020  2:00 PM Unknown   NEG    Propoxyphene Screen, Urine 12/28/2020  2:00 PM Unknown   N/A    Synthetic Cannabinoids (K2) Screen, Urine 12/28/2020  2:00 PM Unknown   NEG    THC Screen, Urine 12/28/2020  2:00 PM Unknown   NEG    Tramadol Scrn, Ur 12/28/2020  2:00 PM Unknown   NEG    Tricyclic Antidepressants, Urine 12/28/2020  2:00 PM Unknown   N/A           Diagnosis Orders   1. Severe opioid use disorder (HCC)  POCT Rapid Drug Screen    buprenorphine-naloxone (SUBOXONE) 8-2 MG FILM SL film   2. Encounter for monitoring Suboxone maintenance therapy     3.  Polysubstance abuse ([facility] [address]           PLAN:  Urine is clean including buprenorphine  We will check oral swab  Comprehensive urine sent  He is on 16 mg daily Suboxone      I reviewed the [place] Automated Rx Reporting System report     There does not appear to be any discrepancies or overprescribing of controlled substances    Follow-up 1 week

## 2021-02-26 ENCOUNTER — HOSPITAL ENCOUNTER (EMERGENCY)
Age: 31
Discharge: HOME | End: 2021-02-26
Attending: EMERGENCY MEDICINE
Payer: MEDICARE

## 2021-02-26 VITALS
OXYGEN SATURATION: 100 % | DIASTOLIC BLOOD PRESSURE: 103 MMHG | WEIGHT: 200 LBS | RESPIRATION RATE: 18 BRPM | HEIGHT: 70 IN | BODY MASS INDEX: 28.63 KG/M2 | TEMPERATURE: 97.8 F | SYSTOLIC BLOOD PRESSURE: 162 MMHG | HEART RATE: 71 BPM

## 2021-02-26 DIAGNOSIS — Z72.0 NICOTINE ABUSE: ICD-10-CM

## 2021-02-26 DIAGNOSIS — L30.9 DERMATITIS: Primary | ICD-10-CM

## 2021-02-26 PROCEDURE — 6370000000 HC RX 637 (ALT 250 FOR IP): Performed by: EMERGENCY MEDICINE

## 2021-02-26 PROCEDURE — 99284 EMERGENCY DEPT VISIT MOD MDM: CPT

## 2021-02-26 RX ORDER — DIPHENHYDRAMINE HCL 25 MG
50 TABLET ORAL EVERY 8 HOURS PRN
Status: DISCONTINUED | OUTPATIENT
Start: 2021-02-26 | End: 2021-02-26

## 2021-02-26 RX ORDER — DIPHENHYDRAMINE HCL 50 MG
50 CAPSULE ORAL EVERY 6 HOURS PRN
Qty: 12 CAPSULE | Refills: 0 | Status: SHIPPED | OUTPATIENT
Start: 2021-02-26

## 2021-02-26 RX ORDER — DIPHENHYDRAMINE HCL 25 MG
50 TABLET ORAL ONCE
Status: COMPLETED | OUTPATIENT
Start: 2021-02-26 | End: 2021-02-26

## 2021-02-26 RX ORDER — PREDNISONE 10 MG/1
TABLET ORAL
Qty: 30 TABLET | Refills: 0 | Status: SHIPPED | OUTPATIENT
Start: 2021-02-26

## 2021-02-26 RX ORDER — PREDNISONE 20 MG/1
60 TABLET ORAL ONCE
Status: COMPLETED | OUTPATIENT
Start: 2021-02-26 | End: 2021-02-26

## 2021-02-26 RX ADMIN — PREDNISONE 60 MG: 20 TABLET ORAL at 00:58

## 2021-02-26 RX ADMIN — DIPHENHYDRAMINE HCL 50 MG: 25 TABLET ORAL at 00:58

## 2021-02-26 ASSESSMENT — PAIN SCALES - GENERAL: PAINLEVEL_OUTOF10: 8

## 2021-02-26 NOTE — ED NOTES
Dr. Kristen Coronel in to discuss plan of care for discharge with patient.      Jose Winston RN  02/26/21 [id number]

## 2021-02-26 NOTE — ED NOTES
Discharged back to half-way in stable condition. Printed prescriptions for Benadryl and Prednisone given with dosing instructions and possible side effects. AVS discussed/reviewed with patient. Patient verbalized understanding of all instructions given; no questions/concerns voiced. Respirations easy, regular and non-labored; no distress noted. Skin pink, warm and dry; mucous membranes moist. Alert and appropriate for age. Ambulated with officer to cruiser.       Karely Duran RN  02/26/21 [id number]

## 2021-02-26 NOTE — ED PROVIDER NOTES
[address]  [address] [address]  Phone: Raul 12 COMPLAINT    Chief Complaint   Patient presents with    Urticaria       HPI    Manpreet Bishop is a 31 y.o. male who presents above-noted complaint. Patient been doing fine. He is currently in skilled nursing. Evidently the got new laundry past today. He has developed some itching. Has some more abraded areas and red spots on his arms and legs. May be on his lips to no difficulty breathing or other issues at this time. Although they said he had some shortness of breath. No tongue swelling. PAST MEDICAL HISTORY    Past Medical History:   Diagnosis Date    Anxiety     Back pain        SURGICAL HISTORY    Past Surgical History:   Procedure Laterality Date    EYE SURGERY         CURRENT MEDICATIONS        ALLERGIES    No Known Allergies    FAMILY HISTORY    Family History   Problem Relation Age of Onset    Kidney Disease Mother     Alcohol Abuse Father        SOCIAL HISTORY    Social History     Socioeconomic History    Marital status: Single     Spouse name: None    Number of children: None    Years of education: None    Highest education level: None   Occupational History    None   Social Needs    Financial resource strain: None    Food insecurity     Worry: None     Inability: None    Transportation needs     Medical: None     Non-medical: None   Tobacco Use    Smoking status: Current Every Day Smoker     Packs/day: 0.50     Types: Cigarettes    Smokeless tobacco: Never Used   Substance and Sexual Activity    Alcohol use: No    Drug use: Yes     Types: Opiates , Marijuana     Comment: last used vicodin 3/17/2020.  last used heroin/fentanyl 3/15/2020    Sexual activity: Yes     Partners: Female   Lifestyle    Physical activity     Days per week: None     Minutes per session: None    Stress: None   Relationships    Social connections     Talks on phone: None Gets together: None     Attends Buddhist service: None     Active member of club or organization: None     Attends meetings of clubs or organizations: None     Relationship status: None    Intimate partner violence     Fear of current or ex partner: None     Emotionally abused: None     Physically abused: None     Forced sexual activity: None   Other Topics Concern    None   Social History Narrative    None       REVIEW OF SYSTEMS    Positive for rash and itching. No chest pain or abdominal pain. All systems negative except as marked. PHYSICAL EXAM    VITAL SIGNS: BP (!) 162/103   Pulse 71   Temp 97.8 °F (36.6 °C) (Oral)   Resp 18   Ht 5' 10\" (1.778 m)   Wt 200 lb (90.7 kg)   SpO2 100%   BMI 28.70 kg/m²    Constitutional:  Alert not toxic or ill, able to give coherent history incarcerated green and white stripe output. HENT: COVID mask protection in place normocephalic, Atraumatic, mask lowered to assess  Bilateral external ears normal, Oropharynx moist, No oral exudates, Nose normal.  Maybe some slight redness to the lips more dry scaled anything else  Cervical Spine: Normal range of motion,  No stridor. No tenderness, Supple,  Eyes:  No discharge or  Swelling,Conjunctiva normal, PERRL, EOMI,  Respiratory: No respiratory distress, Normal breath sounds,  No wheezing, No chest tenderness. Cardiovascular:  Normal heart rate, Normal rhythm, No murmurs, No rubs, No gallops. GI:  No reproducible pain, Bowel sounds normal, Soft, No masses, No pulsatile masses. No tenderness  Musculoskeletal:  Intact distal pulses, No edema, No tenderness, No cyanosis, No clubbing. Good range of motion in all major joints. No tenderness to palpation or major deformities noted. Back:No tenderness. Integument:  Warm, Dry, some dry scaliness to dorsum of both feet. Almost abraded areas. There are some redness to the anterior forehead. No petechia bullae. No target lesions. Lymphatic:  No lymphadenopathy noted. Neurologic:  Alert & oriented x 3, Normal motor function, Normal sensory function, No focal deficits noted. Psychiatric:  Affect normal, Judgment normal, Mood normal.                     RADIOLOGY    No orders to display       PROCEDURES    none      CONSULTS:  None      CRITICAL CARE:  None    SCREENINGS  BP (!) 162/103   Pulse 71   Temp 97.8 °F (36.6 °C) (Oral)   Resp 18   Ht 5' 10\" (1.778 m)   Wt 200 lb (90.7 kg)   SpO2 100%   BMI 28.70 kg/m²        Screening For Hypertension and Follow-up (#317)  patient informed that blood pressure is abnormal and in the pre-hypertension range and should be rechecked by primary care      Screening For Tobacco Use and Cessation Intervention (#226):   reports that he has been smoking cigarettes. He has been smoking about 0.50 packs per day. He has never used smokeless tobacco.  Tobacco cessation encouraged with brief counseling. Written home care instructions for smoking cessation provided. ED COURSE & MEDICAL DECISION MAKING    Pertinent Labs & Imaging studies reviewed. (See chart for details)  Patient presents with a rash. Rather nonspecific almost appears to be eczematous there dry scaly areas. Can see some self induced rash while incarcerated. Does have  Spot to his forehead as well which is unclear why. None of them appear to be target lesions or significant bulla blebs are worrisome severe allergic reactions. Omar Severance cover him with some Benadryl for tonight and some prednisone. He is need to be monitored closely. Nothing else to suggest infectious source      FINAL IMPRESSION    1. Dermatitis    2.  Nicotine abuse         PATIENT REFERRED TO:  Kurt  21. physician    Call   For evaluation      DISCHARGE MEDICATIONS:  New Prescriptions    PREDNISONE (DELTASONE) 10 MG TABLET    4 p.o. q. day for 3 days, 3 p.o. q. day for 3 days, 2 p.o. q. day for 3 days, one p.o. q. day for 3 days           Naima Humphries MD  02/26/21 2810

## 2021-03-22 ENCOUNTER — OFFICE VISIT (OUTPATIENT)
Dept: INTERNAL MEDICINE CLINIC | Age: 31
End: 2021-03-22
Payer: MEDICARE

## 2021-03-22 VITALS
DIASTOLIC BLOOD PRESSURE: 126 MMHG | TEMPERATURE: 98.2 F | HEIGHT: 70 IN | HEART RATE: 89 BPM | WEIGHT: 198 LBS | BODY MASS INDEX: 28.35 KG/M2 | SYSTOLIC BLOOD PRESSURE: 170 MMHG

## 2021-03-22 DIAGNOSIS — Z79.899 ENCOUNTER FOR MONITORING SUBOXONE MAINTENANCE THERAPY: ICD-10-CM

## 2021-03-22 DIAGNOSIS — Z51.81 ENCOUNTER FOR MONITORING SUBOXONE MAINTENANCE THERAPY: ICD-10-CM

## 2021-03-22 DIAGNOSIS — F19.10 POLYSUBSTANCE ABUSE (HCC): ICD-10-CM

## 2021-03-22 DIAGNOSIS — F11.20 SEVERE OPIOID USE DISORDER (HCC): Primary | ICD-10-CM

## 2021-03-22 PROCEDURE — 99213 OFFICE O/P EST LOW 20 MIN: CPT | Performed by: INTERNAL MEDICINE

## 2021-03-22 PROCEDURE — G8427 DOCREV CUR MEDS BY ELIG CLIN: HCPCS | Performed by: INTERNAL MEDICINE

## 2021-03-22 PROCEDURE — G8484 FLU IMMUNIZE NO ADMIN: HCPCS | Performed by: INTERNAL MEDICINE

## 2021-03-22 PROCEDURE — 80305 DRUG TEST PRSMV DIR OPT OBS: CPT | Performed by: INTERNAL MEDICINE

## 2021-03-22 PROCEDURE — 4004F PT TOBACCO SCREEN RCVD TLK: CPT | Performed by: INTERNAL MEDICINE

## 2021-03-22 PROCEDURE — G8417 CALC BMI ABV UP PARAM F/U: HCPCS | Performed by: INTERNAL MEDICINE

## 2021-03-22 RX ORDER — BUPRENORPHINE AND NALOXONE 8; 2 MG/1; MG/1
1 FILM, SOLUBLE BUCCAL; SUBLINGUAL 2 TIMES DAILY
Qty: 8 FILM | Refills: 0 | Status: SHIPPED | OUTPATIENT
Start: 2021-03-22 | End: 2021-03-25 | Stop reason: SDUPTHER

## 2021-03-22 RX ORDER — BUPRENORPHINE AND NALOXONE 8; 2 MG/1; MG/1
1 FILM, SOLUBLE BUCCAL; SUBLINGUAL DAILY
COMMUNITY
End: 2021-03-22 | Stop reason: SDUPTHER

## 2021-03-22 NOTE — PROGRESS NOTES
MEDICATION ASSISTED TREATMENT ENCOUNTER    HISTORY OF PRESENT ILLNESS  Patient presents for evaluation of opioid use and would like to be placed on medication assisted treatment  I saw him here for the first time   (March 18), last time 12/28  He was lost to follow-up since then  He said he relapsed on meth after that  He went to residential for 10 days on 2/23  Since he got out of residential he has been buying Suboxone off the street      His mother was murdered 2 years ago by someone injecting her with drugs  The murder trial begins April 16    He man referred initially here by his bogdan who is a patient of mine  Patient has had problems using pain pills, heroin  Patient started using pain pills7 years ago  A doctor initially prescribed them for a back injury  Patient builds grain bins      Past Medical History:   Diagnosis Date    Anxiety     Back pain    Social history  He is working at [employer]  No girlfriend  He has a 3year-old  ROS     General: He is having some urges and cravings today  PHYSICAL EXAM     Blood pressure (!) 170/126, pulse 89, temperature 98.2 °F (36.8 °C), height 5' 10\" (1.778 m), weight 198 lb (89.8 kg).          General: Patient resting comfortably in no acute distress, he is sitting still        Eyes: Pupils are mildly dilated       Mental Status Examination:  Level of consciousness:  within normal limits and awake  Appearance:  well-appearing, in chair, good grooming and good hygiene  Behavior/Motor:  {Normal  Attitude toward examiner:  cooperative and attentive  Speech:  spontaneous and normal volume  Mood: Normal  Affect:  mood congruent  Thought processes:  linear  Thought content:  Denies homicidal ideations  Suicidal Ideation:  denies suicidal ideation  Delusions:  no evidence of delusions  Perceptual Disturbance:  denies any perceptual disturbance  Cognition:  oriented to person, place, and time    Insight :

## 2021-03-22 NOTE — PROGRESS NOTES
Verbal order per Dr. Hung Albert for urine drug screen. Positive for BUP. Verified results with Krystle Hollis RN. Dr. Hung Albert ordered Suboxone 8mg film BID for patient. Verified dose with patient. Patient was sent home with 4 day script of Suboxone 8mg film BID and will be seen back in the office 3/25/21.

## 2021-03-25 ENCOUNTER — OFFICE VISIT (OUTPATIENT)
Dept: INTERNAL MEDICINE CLINIC | Age: 31
End: 2021-03-25
Payer: MEDICARE

## 2021-03-25 VITALS
DIASTOLIC BLOOD PRESSURE: 89 MMHG | WEIGHT: 198 LBS | HEART RATE: 81 BPM | HEIGHT: 70 IN | BODY MASS INDEX: 28.35 KG/M2 | TEMPERATURE: 99.3 F | SYSTOLIC BLOOD PRESSURE: 139 MMHG

## 2021-03-25 DIAGNOSIS — F11.20 SEVERE OPIOID USE DISORDER (HCC): Primary | ICD-10-CM

## 2021-03-25 DIAGNOSIS — Z79.899 ENCOUNTER FOR MONITORING SUBOXONE MAINTENANCE THERAPY: ICD-10-CM

## 2021-03-25 DIAGNOSIS — F19.10 POLYSUBSTANCE ABUSE (HCC): ICD-10-CM

## 2021-03-25 DIAGNOSIS — Z51.81 ENCOUNTER FOR MONITORING SUBOXONE MAINTENANCE THERAPY: ICD-10-CM

## 2021-03-25 PROCEDURE — G8484 FLU IMMUNIZE NO ADMIN: HCPCS | Performed by: INTERNAL MEDICINE

## 2021-03-25 PROCEDURE — 99213 OFFICE O/P EST LOW 20 MIN: CPT | Performed by: INTERNAL MEDICINE

## 2021-03-25 PROCEDURE — 80305 DRUG TEST PRSMV DIR OPT OBS: CPT | Performed by: INTERNAL MEDICINE

## 2021-03-25 PROCEDURE — 4004F PT TOBACCO SCREEN RCVD TLK: CPT | Performed by: INTERNAL MEDICINE

## 2021-03-25 PROCEDURE — G8417 CALC BMI ABV UP PARAM F/U: HCPCS | Performed by: INTERNAL MEDICINE

## 2021-03-25 PROCEDURE — G8427 DOCREV CUR MEDS BY ELIG CLIN: HCPCS | Performed by: INTERNAL MEDICINE

## 2021-03-25 RX ORDER — BUPRENORPHINE AND NALOXONE 8; 2 MG/1; MG/1
1 FILM, SOLUBLE BUCCAL; SUBLINGUAL 2 TIMES DAILY
Qty: 7 FILM | Refills: 0 | Status: SHIPPED | OUTPATIENT
Start: 2021-03-25 | End: 2021-03-29 | Stop reason: SDUPTHER

## 2021-03-25 NOTE — PROGRESS NOTES
MEDICATION ASSISTED TREATMENT ENCOUNTER    HISTORY OF PRESENT ILLNESS  Patient presents for evaluation of opioid use and would like to be placed on medication assisted treatment  I saw him here for the first time   (March 2020), last time 3/22/21  He was lost to follow-up since December  He said he relapsed on meth after that  He went to [facility] for 10 days on 2/23  Since he got out of [facility] he has been buying Suboxone off the street      His mother was murdered 2 years ago by someone injecting her with drugs  The murder trial begins April 16    He man referred here by his bogdan who is a patient of mine  Patient has had problems using pain pills, heroin  Patient started using pain pills7 years ago  A doctor initially prescribed them for a back injury  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          General: Patient resting comfortably in no acute distress, he is sitting still        Eyes: Pupils are mildly dilated           URINE DRUG SCREEN TODAY (I directly observed):     Alcohol, Urine 03/25/2021 10:15 AM Unknown   NEG    Amphetamine Screen, Urine 03/25/2021 10:15 AM Unknown   NEG    Barbiturate Screen, Urine 03/25/2021 10:15 AM Unknown   NEG    Benzodiazepine Screen, Urine 03/25/2021 10:15 AM Unknown   NEG    Buprenorphine Urine 03/25/2021 10:15 AM Unknown   POS    Cocaine Metabolite Screen, Urine 03/25/2021 10:15 AM Unknown   NEG    FENTANYL SCREEN, URINE 03/25/2021 10:15 AM Unknown   NEG    Gabapentin Screen, Urine 03/25/2021 10:15 AM Unknown   N/A    MDMA, Urine 03/25/2021 10:15 AM Unknown   NEG    Methadone Screen, Urine 03/25/2021 10:15 AM Unknown   NEG    Methamphetamine, Urine 03/25/2021 10:15 AM Unknown   NEG    Opiate Scrn, Ur 03/25/2021 10:15 AM Unknown   NEG    Oxycodone Screen, Ur 03/25/2021 10:15 AM Unknown   NEG    PCP Screen, Urine 03/25/2021 10:15 AM Unknown   NEG    Propoxyphene Screen, Urine 03/25/2021 10:15 AM Unknown   N/A    Synthetic Cannabinoids (K2) Screen, Urine 03/25/2021 10:15 AM Unknown   NEG    THC Screen, Urine 03/25/2021 10:15 AM Unknown   NEG    Tramadol Scrn, Ur 03/25/2021 10:15 AM Unknown   NEG    Tricyclic Antidepressants, Urine            Diagnosis Orders   1. Severe opioid use disorder (HCC)  POCT Rapid Drug Screen    buprenorphine-naloxone (SUBOXONE) 8-2 MG FILM SL film   2. Encounter for monitoring Suboxone maintenance therapy     3.  Polysubstance abuse ([facility] [address]           PLAN:  Urine is clean   Oral swab comprehensive urine sent on 3/22  He is on 16 mg daily Suboxone      I reviewed the [place] Automated Rx Reporting System report     There does not appear to be any discrepancies or overprescribing of controlled substances    Follow-up 3/29

## 2021-03-25 NOTE — PROGRESS NOTES
Verbal order per Dr. ASENCIO [facility] for urine drug screen. Positive for BUP. Verified results with Kalen Peralta LPN. Dr. ASENCIO [facility] ordered Suboxone 8 mg film BID for patient. Verified dose with patient. Patient was sent home with 3 day script for Suboxone 8 mg film BID and will be seen back in the office on 3/29/21. UC sent.

## 2021-03-29 ENCOUNTER — OFFICE VISIT (OUTPATIENT)
Dept: INTERNAL MEDICINE CLINIC | Age: 31
End: 2021-03-29
Payer: MEDICARE

## 2021-03-29 VITALS
HEART RATE: 80 BPM | SYSTOLIC BLOOD PRESSURE: 155 MMHG | WEIGHT: 225 LBS | BODY MASS INDEX: 32.21 KG/M2 | DIASTOLIC BLOOD PRESSURE: 94 MMHG | TEMPERATURE: 98.1 F | HEIGHT: 70 IN

## 2021-03-29 DIAGNOSIS — F11.20 SEVERE OPIOID USE DISORDER (HCC): Primary | ICD-10-CM

## 2021-03-29 DIAGNOSIS — Z79.899 ENCOUNTER FOR MONITORING SUBOXONE MAINTENANCE THERAPY: ICD-10-CM

## 2021-03-29 DIAGNOSIS — Z51.81 ENCOUNTER FOR MONITORING SUBOXONE MAINTENANCE THERAPY: ICD-10-CM

## 2021-03-29 DIAGNOSIS — F19.10 POLYSUBSTANCE ABUSE (HCC): ICD-10-CM

## 2021-03-29 PROCEDURE — 99213 OFFICE O/P EST LOW 20 MIN: CPT | Performed by: INTERNAL MEDICINE

## 2021-03-29 PROCEDURE — 4004F PT TOBACCO SCREEN RCVD TLK: CPT | Performed by: INTERNAL MEDICINE

## 2021-03-29 PROCEDURE — 80305 DRUG TEST PRSMV DIR OPT OBS: CPT | Performed by: INTERNAL MEDICINE

## 2021-03-29 PROCEDURE — G8427 DOCREV CUR MEDS BY ELIG CLIN: HCPCS | Performed by: INTERNAL MEDICINE

## 2021-03-29 PROCEDURE — G8417 CALC BMI ABV UP PARAM F/U: HCPCS | Performed by: INTERNAL MEDICINE

## 2021-03-29 PROCEDURE — G8484 FLU IMMUNIZE NO ADMIN: HCPCS | Performed by: INTERNAL MEDICINE

## 2021-03-29 RX ORDER — BUPRENORPHINE AND NALOXONE 8; 2 MG/1; MG/1
1 FILM, SOLUBLE BUCCAL; SUBLINGUAL 2 TIMES DAILY
Qty: 14 FILM | Refills: 0 | Status: SHIPPED | OUTPATIENT
Start: 2021-03-29 | End: 2021-04-05

## 2021-03-29 NOTE — PROGRESS NOTES
MEDICATION ASSISTED TREATMENT ENCOUNTER    HISTORY OF PRESENT ILLNESS  Patient presents for evaluation of opioid use and would like to be placed on medication assisted treatment  I saw him here for the first time   (March 2020), last time 3/25/21    He was lost to follow-up since December  I started him on Suboxone 3/22  He says he has not used since      His mother was murdered 2 years ago by someone injecting her with drugs  The murder trial begins April 16    He man referred here by his stepsister who is a patient of mine  Patient has had problems using pain pills, heroin  Patient started using pain pills7 years ago  A doctor initially prescribed them for a back injury  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          General: Patient resting comfortably in no acute distress, he is sitting still        Eyes: Pupils are mildly dilated           URINE DRUG SCREEN TODAY (I directly observed):     Alcohol, Urine 03/25/2021 10:15 AM Unknown   NEG    Amphetamine Screen, Urine 03/25/2021 10:15 AM Unknown   NEG    Barbiturate Screen, Urine 03/25/2021 10:15 AM Unknown   NEG    Benzodiazepine Screen, Urine 03/25/2021 10:15 AM Unknown   NEG    Buprenorphine Urine 03/25/2021 10:15 AM Unknown   POS    Cocaine Metabolite Screen, Urine 03/25/2021 10:15 AM Unknown   NEG    FENTANYL SCREEN, URINE 03/25/2021 10:15 AM Unknown   NEG    Gabapentin Screen, Urine 03/25/2021 10:15 AM Unknown   N/A    MDMA, Urine 03/25/2021 10:15 AM Unknown   NEG    Methadone Screen, Urine 03/25/2021 10:15 AM Unknown   NEG    Methamphetamine, Urine 03/25/2021 10:15 AM Unknown   NEG    Opiate Scrn, Ur 03/25/2021 10:15 AM Unknown   NEG    Oxycodone Screen, Ur 03/25/2021 10:15 AM Unknown   NEG    PCP Screen, Urine 03/25/2021 10:15 AM Unknown   NEG    Propoxyphene Screen, Urine 03/25/2021 10:15 AM Unknown   N/A    Synthetic Cannabinoids (K2) Screen, Urine 03/25/2021 10:15 AM Unknown   NEG    THC Screen, Urine 03/25/2021 10:15 AM Unknown   NEG    Tramadol Scrn, Ur 03/25/2021 10:15 AM Unknown   NEG    Tricyclic Antidepressants, Urine            Diagnosis Orders   1. Severe opioid use disorder (HCC)  POCT Rapid Drug Screen    buprenorphine-naloxone (SUBOXONE) 8-2 MG FILM SL film   2. Encounter for monitoring Suboxone maintenance therapy     3.  Polysubstance abuse ([facility] [address]           PLAN:  Urine is clean   Patient is going to be doing counseling at [facility] in [address] [place] [address] out from last week oral swab was negative urine was negative for any illicit substances  He is on 16 mg daily Suboxone      I reviewed the [place] Automated Rx Reporting System report     There does not appear to be any discrepancies or overprescribing of controlled substances    Follow-up 1 week

## 2021-04-06 ENCOUNTER — OFFICE VISIT (OUTPATIENT)
Dept: INTERNAL MEDICINE CLINIC | Age: 31
End: 2021-04-06
Payer: MEDICARE

## 2021-04-06 VITALS
BODY MASS INDEX: 32.07 KG/M2 | HEART RATE: 95 BPM | TEMPERATURE: 99.1 F | SYSTOLIC BLOOD PRESSURE: 95 MMHG | HEIGHT: 70 IN | WEIGHT: 224 LBS | DIASTOLIC BLOOD PRESSURE: 70 MMHG

## 2021-04-06 DIAGNOSIS — Z79.899 ENCOUNTER FOR MONITORING SUBOXONE MAINTENANCE THERAPY: ICD-10-CM

## 2021-04-06 DIAGNOSIS — F11.20 SEVERE OPIOID USE DISORDER (HCC): Primary | ICD-10-CM

## 2021-04-06 DIAGNOSIS — F19.10 POLYSUBSTANCE ABUSE (HCC): ICD-10-CM

## 2021-04-06 DIAGNOSIS — Z51.81 ENCOUNTER FOR MONITORING SUBOXONE MAINTENANCE THERAPY: ICD-10-CM

## 2021-04-06 PROCEDURE — 4004F PT TOBACCO SCREEN RCVD TLK: CPT | Performed by: INTERNAL MEDICINE

## 2021-04-06 PROCEDURE — G8428 CUR MEDS NOT DOCUMENT: HCPCS | Performed by: INTERNAL MEDICINE

## 2021-04-06 PROCEDURE — 80305 DRUG TEST PRSMV DIR OPT OBS: CPT | Performed by: INTERNAL MEDICINE

## 2021-04-06 PROCEDURE — G8417 CALC BMI ABV UP PARAM F/U: HCPCS | Performed by: INTERNAL MEDICINE

## 2021-04-06 PROCEDURE — 99213 OFFICE O/P EST LOW 20 MIN: CPT | Performed by: INTERNAL MEDICINE

## 2021-04-06 RX ORDER — BUPRENORPHINE AND NALOXONE 8; 2 MG/1; MG/1
1 FILM, SOLUBLE BUCCAL; SUBLINGUAL 2 TIMES DAILY
Qty: 14 FILM | Refills: 0 | Status: SHIPPED | OUTPATIENT
Start: 2021-04-06 | End: 2021-04-13 | Stop reason: SDUPTHER

## 2021-04-06 RX ORDER — BUPRENORPHINE AND NALOXONE 8; 2 MG/1; MG/1
1 FILM, SOLUBLE BUCCAL; SUBLINGUAL 2 TIMES DAILY
COMMUNITY
End: 2021-04-06 | Stop reason: SDUPTHER

## 2021-04-06 NOTE — PROGRESS NOTES
MEDICATION ASSISTED TREATMENT ENCOUNTER    HISTORY OF PRESENT ILLNESS  Patient presents for evaluation of opioid use and would like to be placed on medication assisted treatment  I saw him here for the first time   (March 2020), last time 3/29/21    He was lost to follow-up since December  I started him on Suboxone 3/22  He says he has not used since      His mother was murdered 2 years ago by someone injecting her with drugs  The murder trial begins April 16    He man referred here by his stepsister who is a patient of mine  Patient has had problems using pain pills, heroin  Patient started using pain pills7 years ago  A doctor initially prescribed them for a back injury  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 [place]   Apparently in the room the nurse tried to arouse him he was sweaty incoherent  She thought she was going to have to use Narcan  When I came and he was alert but somewhat confused  He denies using    Past Medical History:   Diagnosis Date    Anxiety     Back pain    Social history  He is working at [employer]  No girlfriend  He has a 3year-old  ROS     General: Patient is feeling well  Patient is not experiencing  withdrawal symptoms ,no urges or cravings  Patient is not having any side effects from the buprenorphine    PHYSICAL EXAM     Blood pressure 95/70, pulse 95, temperature 99.1 °F (37.3 °C), height 5' 10\" (1.778 m), weight 224 lb (101.6 kg).          General: Patient resting comfortably in no acute distress, he is sitting still        Eyes: Pupils are mildly dilated           URINE DRUG SCREEN TODAY (I directly observed):   Alcohol, Urine 04/06/2021  9:32 AM Unknown   NEG    Amphetamine Screen, Urine 04/06/2021  9:32 AM Unknown   NEG    Barbiturate Screen, Urine 04/06/2021  9:32 AM Unknown   NEG    Benzodiazepine Screen, Urine 04/06/2021  9:32 AM Unknown   NEG    Buprenorphine Urine 04/06/2021  9:32 AM Unknown   POS    Cocaine Metabolite Screen, Urine 04/06/2021  9:32 AM Unknown   NEG    FENTANYL SCREEN, URINE 04/06/2021  9:32 AM Unknown   NEG    Gabapentin Screen, Urine 04/06/2021  9:32 AM Unknown   N/A    MDMA, Urine 04/06/2021  9:32 AM Unknown   NEG    Methadone Screen, Urine 04/06/2021  9:32 AM Unknown   NEG    Methamphetamine, Urine 04/06/2021  9:32 AM Unknown   NEG    Opiate Scrn, Ur 04/06/2021  9:32 AM Unknown   NEG    Oxycodone Screen, Ur 04/06/2021  9:32 AM Unknown   NEG    PCP Screen, Urine 04/06/2021  9:32 AM Unknown   NEG    Propoxyphene Screen, Urine 04/06/2021  9:32 AM Unknown   N/A    Synthetic Cannabinoids (K2) Screen, Urine 04/06/2021  9:32 AM Unknown   NEG    THC Screen, Urine 04/06/2021  9:32 AM Unknown   NEG    Tramadol Scrn, Ur 04/06/2021  9:32 AM Unknown   NEG    Tricyclic Antidepressants, Urine 04/06/2021  9:32 AM Unknown   N/A           Diagnosis Orders   1. Severe opioid use disorder (HCC)  POCT Rapid Drug Screen    buprenorphine-naloxone (SUBOXONE) 8-2 MG FILM SL film   2. Encounter for monitoring Suboxone maintenance therapy     3.  Polysubstance abuse ([address]           PLAN:  Urine is clean   Patient is going to be doing counseling at [facility] in [address] [place] [address] out from last week oral swab was negative urine was negative for any illicit substances  He is on 16 mg daily Suboxone      I reviewed the [place] Automated Rx Reporting System report     There does not appear to be any discrepancies or overprescribing of controlled substances    Follow-up 1 week Home

## 2021-04-13 ENCOUNTER — OFFICE VISIT (OUTPATIENT)
Dept: INTERNAL MEDICINE CLINIC | Age: 31
End: 2021-04-13
Payer: MEDICARE

## 2021-04-13 VITALS
WEIGHT: 228 LBS | HEIGHT: 70 IN | HEART RATE: 85 BPM | SYSTOLIC BLOOD PRESSURE: 133 MMHG | DIASTOLIC BLOOD PRESSURE: 86 MMHG | BODY MASS INDEX: 32.64 KG/M2 | TEMPERATURE: 99.9 F

## 2021-04-13 DIAGNOSIS — Z79.899 ENCOUNTER FOR MONITORING SUBOXONE MAINTENANCE THERAPY: ICD-10-CM

## 2021-04-13 DIAGNOSIS — Z51.81 ENCOUNTER FOR MONITORING SUBOXONE MAINTENANCE THERAPY: ICD-10-CM

## 2021-04-13 DIAGNOSIS — F11.20 SEVERE OPIOID USE DISORDER (HCC): Primary | ICD-10-CM

## 2021-04-13 DIAGNOSIS — F19.10 POLYSUBSTANCE ABUSE (HCC): ICD-10-CM

## 2021-04-13 PROCEDURE — 4004F PT TOBACCO SCREEN RCVD TLK: CPT | Performed by: INTERNAL MEDICINE

## 2021-04-13 PROCEDURE — 99213 OFFICE O/P EST LOW 20 MIN: CPT | Performed by: INTERNAL MEDICINE

## 2021-04-13 PROCEDURE — G8427 DOCREV CUR MEDS BY ELIG CLIN: HCPCS | Performed by: INTERNAL MEDICINE

## 2021-04-13 PROCEDURE — G8417 CALC BMI ABV UP PARAM F/U: HCPCS | Performed by: INTERNAL MEDICINE

## 2021-04-13 PROCEDURE — 80305 DRUG TEST PRSMV DIR OPT OBS: CPT | Performed by: INTERNAL MEDICINE

## 2021-04-13 RX ORDER — BUPRENORPHINE AND NALOXONE 8; 2 MG/1; MG/1
1 FILM, SOLUBLE BUCCAL; SUBLINGUAL 2 TIMES DAILY
Qty: 28 FILM | Refills: 0 | Status: SHIPPED | OUTPATIENT
Start: 2021-04-13 | End: 2021-04-27 | Stop reason: SDUPTHER

## 2021-04-13 NOTE — PROGRESS NOTES
Verbal order per Dr. Geno Lewis for urine drug screen. Positive for BUP. Verified results with Jeannine Prader., LPN. Dr. Geno Lewis ordered Suboxone 8 mg film BID for patient. Verified dose with patient. Patient was sent home with 2 week script for Suboxone 8 mg film BID and will be seen back in the office on 4/27/21.

## 2021-04-13 NOTE — PROGRESS NOTES
MEDICATION ASSISTED TREATMENT ENCOUNTER    HISTORY OF PRESENT ILLNESS  Patient presents for evaluation of opioid use and would like to be placed on medication assisted treatment  I saw him here for the first time   (March 2020), last time 4/6  He was lost to follow-up since December  I started him on Suboxone 3/22  He says he has not used since      His mother was murdered 2 years ago by someone injecting her with drugs  The murder trial has been pushed back until July    He man referred here by his stepsister who is a patient of mine  Patient has had problems using pain pills, heroin  Patient started using pain pills7 years ago  A doctor initially prescribed them for a back injury  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 [place]   Apparently in the room the nurse tried to arouse him he was sweaty incoherent  She thought she was going to have to use Narcan  When I came and he was alert but somewhat confused  He denies using    Past Medical History:   Diagnosis Date    Anxiety     Back pain    Social history  He is working at [employer]  No girlfriend  He has a 3year-old  ROS     General: Patient is feeling well  Patient is not experiencing  withdrawal symptoms ,no urges or cravings  Patient is not having any side effects from the buprenorphine    PHYSICAL EXAM     Blood pressure 133/86, pulse 85, temperature 99.9 °F (37.7 °C), height 5' 10\" (1.778 m), weight 228 lb (103.4 kg).          General: Patient resting comfortably in no acute distress, he is sitting still        Eyes: Pupils are mildly dilated           URINE DRUG SCREEN TODAY   Alcohol, Urine 04/13/2021 10:55 AM Unknown   NEG    Amphetamine Screen, Urine 04/13/2021 10:55 AM Unknown   NEG    Barbiturate Screen, Urine 04/13/2021 10:55 AM Unknown   NEG    Benzodiazepine Screen, Urine 04/13/2021 10:55 AM Unknown   NEG    Buprenorphine Urine 04/13/2021 10:55 AM Unknown

## 2021-04-27 ENCOUNTER — OFFICE VISIT (OUTPATIENT)
Dept: INTERNAL MEDICINE CLINIC | Age: 31
End: 2021-04-27
Payer: MEDICARE

## 2021-04-27 VITALS
WEIGHT: 225 LBS | HEIGHT: 70 IN | DIASTOLIC BLOOD PRESSURE: 92 MMHG | HEART RATE: 82 BPM | BODY MASS INDEX: 32.21 KG/M2 | TEMPERATURE: 97.5 F | SYSTOLIC BLOOD PRESSURE: 160 MMHG

## 2021-04-27 DIAGNOSIS — F11.20 SEVERE OPIOID USE DISORDER (HCC): Primary | ICD-10-CM

## 2021-04-27 DIAGNOSIS — Z79.899 ENCOUNTER FOR MONITORING SUBOXONE MAINTENANCE THERAPY: ICD-10-CM

## 2021-04-27 DIAGNOSIS — Z51.81 ENCOUNTER FOR MONITORING SUBOXONE MAINTENANCE THERAPY: ICD-10-CM

## 2021-04-27 DIAGNOSIS — F19.10 POLYSUBSTANCE ABUSE (HCC): ICD-10-CM

## 2021-04-27 PROCEDURE — 4004F PT TOBACCO SCREEN RCVD TLK: CPT | Performed by: INTERNAL MEDICINE

## 2021-04-27 PROCEDURE — 80305 DRUG TEST PRSMV DIR OPT OBS: CPT | Performed by: INTERNAL MEDICINE

## 2021-04-27 PROCEDURE — G8427 DOCREV CUR MEDS BY ELIG CLIN: HCPCS | Performed by: INTERNAL MEDICINE

## 2021-04-27 PROCEDURE — 99213 OFFICE O/P EST LOW 20 MIN: CPT | Performed by: INTERNAL MEDICINE

## 2021-04-27 PROCEDURE — G8417 CALC BMI ABV UP PARAM F/U: HCPCS | Performed by: INTERNAL MEDICINE

## 2021-04-27 RX ORDER — BUPRENORPHINE AND NALOXONE 8; 2 MG/1; MG/1
1 FILM, SOLUBLE BUCCAL; SUBLINGUAL 2 TIMES DAILY
Qty: 28 FILM | Refills: 0 | Status: SHIPPED | OUTPATIENT
Start: 2021-04-27 | End: 2021-05-11 | Stop reason: SDUPTHER

## 2021-04-27 NOTE — PROGRESS NOTES
MEDICATION ASSISTED TREATMENT ENCOUNTER    HISTORY OF PRESENT ILLNESS  Patient presents for evaluation of opioid use and would like to be placed on medication assisted treatment  I saw him here for the first time   (March 2020), last time 4/6  He was lost to follow-up since December  I started him on Suboxone 3/22  He says he has not used since      His mother was murdered 2 years ago by someone injecting her with drugs  The murder trial has been pushed back until July    He man referred here by his stepsister who is a patient of mine  Patient has had problems using pain pills, heroin  Patient started using pain pills7 years ago  A doctor initially prescribed them for a back injury  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 [place]   Apparently in the room the nurse tried to arouse him he was sweaty incoherent  She thought she was going to have to use Narcan  When I came and he was alert but somewhat confused  He denies using    Past Medical History:   Diagnosis Date    Anxiety     Back pain    Social history  He is working at [employer]  No girlfriend  He has a 3year-old  ROS     General: Patient is feeling well  Patient is not experiencing  withdrawal symptoms ,no urges or cravings  Patient is not having any side effects from the buprenorphine    PHYSICAL EXAM     Blood pressure (!) 160/92, pulse 82, temperature 97.5 °F (36.4 °C), height 5' 10\" (1.778 m), weight 225 lb (102.1 kg).          General: Patient resting comfortably in no acute distress, he is sitting still        Eyes: Pupils are mildly dilated           URINE DRUG SCREEN TODAY   Alcohol, Urine 04/27/2021 11:05 AM Unknown   NEG    Amphetamine Screen, Urine 04/27/2021 11:05 AM Unknown   NEG    Barbiturate Screen, Urine 04/27/2021 11:05 AM Unknown   NEG    Benzodiazepine Screen, Urine 04/27/2021 11:05 AM Unknown   NEG    Buprenorphine Urine 04/27/2021 11:05 AM

## 2021-04-27 NOTE — PROGRESS NOTES
Verbal order per  [facility] for urine drug screen. Positive for BUP. Verified results with Stuart Cervantes RN.  [facility] ordered Suboxone 8mg film BID for patient. Verified dose with patient. Patient was sent home with 2 week script of Suboxone 8mg film BID and will be seen back in the office 5/11/21.

## 2021-05-11 ENCOUNTER — OFFICE VISIT (OUTPATIENT)
Dept: INTERNAL MEDICINE CLINIC | Age: 31
End: 2021-05-11
Payer: MEDICARE

## 2021-05-11 VITALS
HEIGHT: 70 IN | DIASTOLIC BLOOD PRESSURE: 86 MMHG | BODY MASS INDEX: 31.92 KG/M2 | TEMPERATURE: 99.5 F | SYSTOLIC BLOOD PRESSURE: 152 MMHG | WEIGHT: 223 LBS | HEART RATE: 103 BPM

## 2021-05-11 DIAGNOSIS — F11.20 SEVERE OPIOID USE DISORDER (HCC): Primary | ICD-10-CM

## 2021-05-11 DIAGNOSIS — F19.10 POLYSUBSTANCE ABUSE (HCC): ICD-10-CM

## 2021-05-11 DIAGNOSIS — Z51.81 ENCOUNTER FOR MONITORING SUBOXONE MAINTENANCE THERAPY: ICD-10-CM

## 2021-05-11 DIAGNOSIS — Z79.899 ENCOUNTER FOR MONITORING SUBOXONE MAINTENANCE THERAPY: ICD-10-CM

## 2021-05-11 PROCEDURE — 4004F PT TOBACCO SCREEN RCVD TLK: CPT | Performed by: INTERNAL MEDICINE

## 2021-05-11 PROCEDURE — 80305 DRUG TEST PRSMV DIR OPT OBS: CPT | Performed by: INTERNAL MEDICINE

## 2021-05-11 PROCEDURE — 99213 OFFICE O/P EST LOW 20 MIN: CPT | Performed by: INTERNAL MEDICINE

## 2021-05-11 PROCEDURE — G8427 DOCREV CUR MEDS BY ELIG CLIN: HCPCS | Performed by: INTERNAL MEDICINE

## 2021-05-11 PROCEDURE — G8417 CALC BMI ABV UP PARAM F/U: HCPCS | Performed by: INTERNAL MEDICINE

## 2021-05-11 RX ORDER — BUPRENORPHINE AND NALOXONE 8; 2 MG/1; MG/1
1 FILM, SOLUBLE BUCCAL; SUBLINGUAL 2 TIMES DAILY
Qty: 26 FILM | Refills: 0 | Status: SHIPPED | OUTPATIENT
Start: 2021-05-11 | End: 2021-05-24 | Stop reason: SDUPTHER

## 2021-05-11 NOTE — PROGRESS NOTES
Verbal order per Dr. Malik Celeste for urine drug screen. Positive for BUP. Verified results with Lalit Ronquillo LPN. Dr. Malik Celeste ordered Suboxone 8 mg film BID for patient. Verified dose with patient. Patient was sent home with 13 day script for Suboxone 8 mg film BID and will be seen back in the office on 5/24/21.
Unknown   POS    Cocaine Metabolite Screen, Urine 05/11/2021 10:25 AM Unknown   NEG    FENTANYL SCREEN, URINE 05/11/2021 10:25 AM Unknown   NEG    Gabapentin Screen, Urine 05/11/2021 10:25 AM Unknown   N/A    MDMA, Urine 05/11/2021 10:25 AM Unknown   NEG    Methadone Screen, Urine 05/11/2021 10:25 AM Unknown   NEG    Methamphetamine, Urine 05/11/2021 10:25 AM Unknown   NEG    Opiate Scrn, Ur 05/11/2021 10:25 AM Unknown   NEG    Oxycodone Screen, Ur 05/11/2021 10:25 AM Unknown   NEG    PCP Screen, Urine 05/11/2021 10:25 AM Unknown   NEG    Propoxyphene Screen, Urine 05/11/2021 10:25 AM Unknown   N/A    Synthetic Cannabinoids (K2) Screen, Urine 05/11/2021 10:25 AM Unknown   NEG    THC Screen, Urine 05/11/2021 10:25 AM Unknown   NEG    Tramadol Scrn, Ur 05/11/2021 10:25 AM Unknown   NEG    Tricyclic Antidepressants, Urine 05/11/2021 10:25 AM Unknown   N/A         Diagnosis Orders   1. Severe opioid use disorder (HCC)  POCT Rapid Drug Screen    buprenorphine-naloxone (SUBOXONE) 8-2 MG FILM SL film   2. Encounter for monitoring Suboxone maintenance therapy     3.  Polysubstance abuse ([facility] [address]           PLAN:  Urine is clean   Patient is going to be doing counseling at [email or web address] in [address] [place] [address] out from last week oral swab was negative urine was negative for any illicit substances  He is on 16 mg daily Suboxone      I reviewed the [place] Automated Rx Reporting System report     There does not appear to be any discrepancies or overprescribing of controlled substances    Follow-up 2 weeks

## 2021-05-24 ENCOUNTER — OFFICE VISIT (OUTPATIENT)
Dept: INTERNAL MEDICINE CLINIC | Age: 31
End: 2021-05-24
Payer: COMMERCIAL

## 2021-05-24 VITALS
HEIGHT: 70 IN | BODY MASS INDEX: 32.21 KG/M2 | SYSTOLIC BLOOD PRESSURE: 131 MMHG | TEMPERATURE: 97.8 F | WEIGHT: 225 LBS | DIASTOLIC BLOOD PRESSURE: 89 MMHG | HEART RATE: 95 BPM

## 2021-05-24 DIAGNOSIS — F19.10 POLYSUBSTANCE ABUSE (HCC): ICD-10-CM

## 2021-05-24 DIAGNOSIS — Z79.899 ENCOUNTER FOR MONITORING SUBOXONE MAINTENANCE THERAPY: ICD-10-CM

## 2021-05-24 DIAGNOSIS — Z51.81 ENCOUNTER FOR MONITORING SUBOXONE MAINTENANCE THERAPY: ICD-10-CM

## 2021-05-24 DIAGNOSIS — F11.20 SEVERE OPIOID USE DISORDER (HCC): Primary | ICD-10-CM

## 2021-05-24 PROCEDURE — G8428 CUR MEDS NOT DOCUMENT: HCPCS | Performed by: INTERNAL MEDICINE

## 2021-05-24 PROCEDURE — 80305 DRUG TEST PRSMV DIR OPT OBS: CPT | Performed by: INTERNAL MEDICINE

## 2021-05-24 PROCEDURE — G8417 CALC BMI ABV UP PARAM F/U: HCPCS | Performed by: INTERNAL MEDICINE

## 2021-05-24 PROCEDURE — 4004F PT TOBACCO SCREEN RCVD TLK: CPT | Performed by: INTERNAL MEDICINE

## 2021-05-24 PROCEDURE — 99213 OFFICE O/P EST LOW 20 MIN: CPT | Performed by: INTERNAL MEDICINE

## 2021-05-24 RX ORDER — BUPRENORPHINE AND NALOXONE 8; 2 MG/1; MG/1
1 FILM, SOLUBLE BUCCAL; SUBLINGUAL 2 TIMES DAILY
Qty: 30 FILM | Refills: 0 | Status: SHIPPED | OUTPATIENT
Start: 2021-05-24 | End: 2021-06-08 | Stop reason: SDUPTHER

## 2021-05-24 NOTE — PROGRESS NOTES
MEDICATION ASSISTED TREATMENT ENCOUNTER    HISTORY OF PRESENT ILLNESS  Patient presents for evaluation of opioid use and would like to be placed on medication assisted treatment  I saw him here for the first time   (March 2020), last time 5/11  He was lost to follow-up since December  I started him on Suboxone 3/22  He says he has not used since      His mother was murdered 2 years ago by someone injecting her with drugs  The murder trial has been pushed back until July    He man referred here by his stepsister who is a patient of mine  Patient has had problems using pain pills, heroin  Patient started using pain pills7 years ago  A doctor initially prescribed them for a back injury  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 [place]   Apparently in the room the nurse tried to arouse him he was sweaty incoherent  She thought she was going to have to use Narcan  When I came and he was alert but somewhat confused  He denies using    Past Medical History:   Diagnosis Date    Anxiety     Back pain    Social history  He is working at [employer]  No girlfriend  He has a 3year-old  ROS     General: Patient is feeling well  Patient is not experiencing  withdrawal symptoms ,no urges or cravings  Patient is not having any side effects from the buprenorphine    PHYSICAL EXAM     Blood pressure 131/89, pulse 95, temperature 97.8 °F (36.6 °C), height 5' 10\" (1.778 m), weight 225 lb (102.1 kg).          General: Patient resting comfortably in no acute distress, he is sitting still        Eyes: Pupils are mildly dilated            URINE DRUG SCREEN TODAY   Alcohol, Urine 05/24/2021  5:09 PM Unknown   NEG    Amphetamine Screen, Urine 05/24/2021  5:09 PM Unknown   NEG    Barbiturate Screen, Urine 05/24/2021  5:09 PM Unknown   NEG    Benzodiazepine Screen, Urine 05/24/2021  5:09 PM Unknown   NEG    Buprenorphine Urine 05/24/2021  5:09 PM Unknown   POS    Cocaine Metabolite Screen, Urine 05/24/2021  5:09 PM Unknown   NEG    FENTANYL SCREEN, URINE 05/24/2021  5:09 PM Unknown   NEG    Gabapentin Screen, Urine 05/24/2021  5:09 PM Unknown   N/A    MDMA, Urine 05/24/2021  5:09 PM Unknown   NEG    Methadone Screen, Urine 05/24/2021  5:09 PM Unknown   NEG    Methamphetamine, Urine 05/24/2021  5:09 PM Unknown   NEG    Opiate Scrn, Ur 05/24/2021  5:09 PM Unknown   NEG    Oxycodone Screen, Ur 05/24/2021  5:09 PM Unknown   NEG    PCP Screen, Urine 05/24/2021  5:09 PM Unknown   NEG    Propoxyphene Screen, Urine 05/24/2021  5:09 PM Unknown   N/A    Synthetic Cannabinoids (K2) Screen, Urine 05/24/2021  5:09 PM Unknown   NEG    THC Screen, Urine 05/24/2021  5:09 PM Unknown   NEG    Tramadol Scrn, Ur 05/24/2021  5:09 PM Unknown   NEG    Tricyclic Antidepressants, Urine 05/24/2021  5:09 PM Unknown   N/A         Diagnosis Orders   1. Severe opioid use disorder (HCC)  POCT Rapid Drug Screen    buprenorphine-naloxone (SUBOXONE) 8-2 MG FILM SL film   2. Encounter for monitoring Suboxone maintenance therapy     3.  Polysubstance abuse ([facility] [address]           PLAN:  Urine is clean   [facility] in [address] [place] [address] out from last week oral swab was negative urine was negative for any illicit substances  He is on 16 mg daily Suboxone      I reviewed the [place] Automated Rx Reporting System report     There does not appear to be any discrepancies or overprescribing of controlled substances    Follow-up 2 weeks

## 2021-05-24 NOTE — PROGRESS NOTES
Verbal order per Dr. Shira Webster for urine drug screen. Positive for BUP. Verified results with Bernard Sandoval RN. Dr. Shira Webster ordered Suboxone 8mg film BID  for patient. Verified dose with patient. Patient was sent home with 15 day script of Suboxone 8mg film BID and will be seen back in the office 6/8/21.

## 2021-06-08 ENCOUNTER — OFFICE VISIT (OUTPATIENT)
Dept: INTERNAL MEDICINE CLINIC | Age: 31
End: 2021-06-08
Payer: MEDICARE

## 2021-06-08 VITALS
HEART RATE: 103 BPM | WEIGHT: 225 LBS | HEIGHT: 70 IN | TEMPERATURE: 99.3 F | BODY MASS INDEX: 32.21 KG/M2 | DIASTOLIC BLOOD PRESSURE: 88 MMHG | SYSTOLIC BLOOD PRESSURE: 144 MMHG

## 2021-06-08 DIAGNOSIS — F11.20 SEVERE OPIOID USE DISORDER (HCC): Primary | ICD-10-CM

## 2021-06-08 DIAGNOSIS — F19.10 POLYSUBSTANCE ABUSE (HCC): ICD-10-CM

## 2021-06-08 DIAGNOSIS — Z79.899 ENCOUNTER FOR MONITORING SUBOXONE MAINTENANCE THERAPY: ICD-10-CM

## 2021-06-08 DIAGNOSIS — Z51.81 ENCOUNTER FOR MONITORING SUBOXONE MAINTENANCE THERAPY: ICD-10-CM

## 2021-06-08 PROCEDURE — 4004F PT TOBACCO SCREEN RCVD TLK: CPT | Performed by: INTERNAL MEDICINE

## 2021-06-08 PROCEDURE — G8427 DOCREV CUR MEDS BY ELIG CLIN: HCPCS | Performed by: INTERNAL MEDICINE

## 2021-06-08 PROCEDURE — 99213 OFFICE O/P EST LOW 20 MIN: CPT | Performed by: INTERNAL MEDICINE

## 2021-06-08 PROCEDURE — 80305 DRUG TEST PRSMV DIR OPT OBS: CPT | Performed by: INTERNAL MEDICINE

## 2021-06-08 PROCEDURE — G8417 CALC BMI ABV UP PARAM F/U: HCPCS | Performed by: INTERNAL MEDICINE

## 2021-06-08 RX ORDER — BUPRENORPHINE AND NALOXONE 8; 2 MG/1; MG/1
1 FILM, SOLUBLE BUCCAL; SUBLINGUAL 2 TIMES DAILY
Qty: 2 FILM | Refills: 0 | Status: SHIPPED | OUTPATIENT
Start: 2021-06-08 | End: 2021-06-09 | Stop reason: SDUPTHER

## 2021-06-08 RX ORDER — BUPRENORPHINE AND NALOXONE 8; 2 MG/1; MG/1
1 FILM, SOLUBLE BUCCAL; SUBLINGUAL ONCE
Status: COMPLETED | OUTPATIENT
Start: 2021-06-08 | End: 2021-06-08

## 2021-06-08 RX ADMIN — BUPRENORPHINE AND NALOXONE 1 FILM: 8; 2 FILM, SOLUBLE BUCCAL; SUBLINGUAL at 10:15

## 2021-06-08 NOTE — PROGRESS NOTES
MEDICATION ASSISTED TREATMENT ENCOUNTER    HISTORY OF PRESENT ILLNESS  Patient presents for evaluation of opioid use and would like to be placed on medication assisted treatment  I saw him here for the first time   (March 2020), last time 5/24  He was visiting a friend he said his Suboxone was in his car  He thinks somebody stole it  He did not call the police because he did not want to get him in trouble  He said he did not relapse and did not buy any Suboxone off the street  He was lost to follow-up since December  I started him on Suboxone 3/22  He says he has not used since      His mother was murdered 2 years ago by someone injecting her with drugs  The murder trial has been pushed back until July    He man referred here by his stepsister who is a patient of mine  Patient has had problems using pain pills, heroin  Patient started using pain pills7 years ago  A doctor initially prescribed them for a back injury  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   COWS 28       General: Patient resting comfortably in no acute distress, he is sitting still        Eyes: Pupils are mildly dilated     He has tremors he is yawning       URINE DRUG SCREEN TODAY   Alcohol, Urine 06/08/2021  9:10 AM Unknown   NEG    Amphetamine Screen, Urine 06/08/2021  9:10 AM Unknown   NEG Barbiturate Screen, Urine 06/08/2021  9:10 AM Unknown   NEG    Benzodiazepine Screen, Urine 06/08/2021  9:10 AM Unknown   NEG    Buprenorphine Urine 06/08/2021  9:10 AM Unknown   POS    Cocaine Metabolite Screen, Urine 06/08/2021  9:10 AM Unknown   NEG    FENTANYL SCREEN, URINE 06/08/2021  9:10 AM Unknown   NEG    Gabapentin Screen, Urine 06/08/2021  9:10 AM Unknown   N/A    MDMA, Urine 06/08/2021  9:10 AM Unknown   NEG    Methadone Screen, Urine 06/08/2021  9:10 AM Unknown   NEG    Methamphetamine, Urine 06/08/2021  9:10 AM Unknown   NEG    Opiate Scrn, Ur 06/08/2021  9:10 AM Unknown   NEG    Oxycodone Screen, Ur 06/08/2021  9:10 AM Unknown   NEG    PCP Screen, Urine 06/08/2021  9:10 AM Unknown   NEG    Propoxyphene Screen, Urine 06/08/2021  9:10 AM Unknown   N/A    Synthetic Cannabinoids (K2) Screen, Urine 06/08/2021  9:10 AM Unknown   NEG    THC Screen, Urine 06/08/2021  9:10 AM Unknown   NEG    Tramadol Scrn, Ur 06/08/2021  9:10 AM Unknown   NEG    Tricyclic Antidepressants, Urine 06/08/2021  9:10 AM Unknown   N/A         Diagnosis Orders   1. Severe opioid use disorder (HCC)  POCT Rapid Drug Screen    buprenorphine-naloxone (SUBOXONE) 8-2 MG SL film 1 Film    DISCONTINUED: buprenorphine-naloxone (SUBOXONE) 8-2 MG FILM SL film   2. Encounter for monitoring Suboxone maintenance therapy     3.  Polysubstance abuse ([facility] [address]           PLAN:  Urine looks good  We will give him a sl prescription for Suboxone 8 mg twice daily  Follow-up here tomorrow  I reviewed the [place] Automated Rx Reporting System report     There does not appear to be any discrepancies or overprescribing of controlled substances

## 2021-06-09 ENCOUNTER — OFFICE VISIT (OUTPATIENT)
Dept: INTERNAL MEDICINE CLINIC | Age: 31
End: 2021-06-09
Payer: MEDICARE

## 2021-06-09 VITALS
SYSTOLIC BLOOD PRESSURE: 148 MMHG | HEIGHT: 70 IN | HEART RATE: 82 BPM | DIASTOLIC BLOOD PRESSURE: 91 MMHG | TEMPERATURE: 97.6 F | BODY MASS INDEX: 33.07 KG/M2 | WEIGHT: 231 LBS

## 2021-06-09 DIAGNOSIS — Z51.81 ENCOUNTER FOR MONITORING SUBOXONE MAINTENANCE THERAPY: ICD-10-CM

## 2021-06-09 DIAGNOSIS — F11.20 SEVERE OPIOID USE DISORDER (HCC): Primary | ICD-10-CM

## 2021-06-09 DIAGNOSIS — Z79.899 ENCOUNTER FOR MONITORING SUBOXONE MAINTENANCE THERAPY: ICD-10-CM

## 2021-06-09 DIAGNOSIS — F19.10 POLYSUBSTANCE ABUSE (HCC): ICD-10-CM

## 2021-06-09 PROCEDURE — G8427 DOCREV CUR MEDS BY ELIG CLIN: HCPCS | Performed by: INTERNAL MEDICINE

## 2021-06-09 PROCEDURE — G8417 CALC BMI ABV UP PARAM F/U: HCPCS | Performed by: INTERNAL MEDICINE

## 2021-06-09 PROCEDURE — 80305 DRUG TEST PRSMV DIR OPT OBS: CPT | Performed by: INTERNAL MEDICINE

## 2021-06-09 PROCEDURE — 4004F PT TOBACCO SCREEN RCVD TLK: CPT | Performed by: INTERNAL MEDICINE

## 2021-06-09 PROCEDURE — 99213 OFFICE O/P EST LOW 20 MIN: CPT | Performed by: INTERNAL MEDICINE

## 2021-06-09 RX ORDER — BUPRENORPHINE AND NALOXONE 8; 2 MG/1; MG/1
1 FILM, SOLUBLE BUCCAL; SUBLINGUAL 2 TIMES DAILY
Qty: 10 FILM | Refills: 0 | Status: SHIPPED | OUTPATIENT
Start: 2021-06-09 | End: 2021-06-14 | Stop reason: SDUPTHER

## 2021-06-09 NOTE — PROGRESS NOTES
MEDICATION ASSISTED TREATMENT ENCOUNTER    HISTORY OF PRESENT ILLNESS  Patient presents for evaluation of opioid use and would like to be placed on medication assisted treatment  I saw him here for the first time   (March 2020), last time 6/8  He was visiting a friend he said his Suboxone was in his car  He thinks somebody stole it  He did not call the police because he did not want to get him in trouble  He said he did not relapse and did not buy any Suboxone off the street  Yesterday he was in severe withdrawal cows score of 28  I gave him a Suboxone 8 mg the office I sent him home with 2-8 mg strips  He took 1 about 3 AM this morning and about noon today    His mother was murdered 2 years ago by someone injecting her with drugs  The murder trial has been pushed back until July    He man referred here by his bogdan who is a patient of mine  Patient has had problems using pain pills, heroin  Patient started using pain pills7 years ago  A doctor initially prescribed them for a back injury  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   COWS 28       General: Patient resting comfortably in no acute distress, he is sitting still        Eyes: Pupils are mildly dilated            URINE DRUG SCREEN TODAY   Alcohol, Urine 06/09/2021  3:00 PM Unknown   NEG    Amphetamine Screen, Urine 06/09/2021  3:00 PM Unknown   NEG    Barbiturate

## 2021-06-09 NOTE — PROGRESS NOTES
Verbal order per Dr. Jair Garcia for urine drug screen. Positive for BUP. Verified results with Nehemiah Padilla LPN. Dr. Jair Garcia ordered Suboxone 8 mg film BID for patient. Verified dose with patient. Patient was sent home with 5 day script for Suboxone 8 mg film BID and will be seen back in the office on 6/14/21.

## 2021-06-14 ENCOUNTER — VIRTUAL VISIT (OUTPATIENT)
Dept: INTERNAL MEDICINE CLINIC | Age: 31
End: 2021-06-14
Payer: MEDICARE

## 2021-06-14 DIAGNOSIS — F11.20 SEVERE OPIOID USE DISORDER (HCC): Primary | ICD-10-CM

## 2021-06-14 DIAGNOSIS — F19.10 POLYSUBSTANCE ABUSE (HCC): ICD-10-CM

## 2021-06-14 DIAGNOSIS — Z79.899 ENCOUNTER FOR MONITORING SUBOXONE MAINTENANCE THERAPY: ICD-10-CM

## 2021-06-14 DIAGNOSIS — Z51.81 ENCOUNTER FOR MONITORING SUBOXONE MAINTENANCE THERAPY: ICD-10-CM

## 2021-06-14 PROCEDURE — 99213 OFFICE O/P EST LOW 20 MIN: CPT | Performed by: INTERNAL MEDICINE

## 2021-06-14 PROCEDURE — G8417 CALC BMI ABV UP PARAM F/U: HCPCS | Performed by: INTERNAL MEDICINE

## 2021-06-14 PROCEDURE — G8428 CUR MEDS NOT DOCUMENT: HCPCS | Performed by: INTERNAL MEDICINE

## 2021-06-14 PROCEDURE — 4004F PT TOBACCO SCREEN RCVD TLK: CPT | Performed by: INTERNAL MEDICINE

## 2021-06-14 RX ORDER — BUPRENORPHINE AND NALOXONE 8; 2 MG/1; MG/1
1 FILM, SOLUBLE BUCCAL; SUBLINGUAL 2 TIMES DAILY
Qty: 8 FILM | Refills: 0 | Status: SHIPPED | OUTPATIENT
Start: 2021-06-14 | End: 2021-06-17 | Stop reason: SDUPTHER

## 2021-06-14 NOTE — PROGRESS NOTES
2021    TELEHEALTH EVALUATION -- Audio/Visual (During CTSCA-42 public health emergency)    HPI:  A video conference was done with the patient  Patient was at home, I was in the office  There was no  one else  present during the conference    Hoa Blunt (:  1990) has requested an audio/video evaluation for the following concern(s):    HPI  I saw him here for the first time   (2020), last time   He was visiting a friend he said his Suboxone was in his car  He thinks somebody stole it  He did not call the police because he did not want to get him in trouble  He said he did not relapse and did not buy any Suboxone off the street  Yesterday he was in severe withdrawal cows score of 28  I gave him a Suboxone 8 mg the office I sent him home with 2-8 mg strips  He took 1 about 3 AM this morning and about noon today     His mother was murdered 2 years ago by someone injecting her with drugs  The murder trial has been pushed back until July     He man referred here by his stepsister who is a patient of mine  Patient has had problems using pain pills, heroin  Patient started using pain pills7 years ago  A doctor initially prescribed them for a back injury  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 Authorizing Provider   buprenorphine-naloxone (SUBOXONE) 8-2 MG FILM SL film Place 1 Film under the tongue 2 times daily for 14 days.   Janeth Gentile MD   Buprenorphine HCl-Naloxone HCl (SUBOXONE SL) Place 8 mg under the tongue 2 times daily  Historical Provider, MD predniSONE (DELTASONE) 10 MG tablet 4 p.o. q. day for 3 days, 3 p.o. q. day for 3 days, 2 p.o. q. day for 3 days, one p.o. q. day for 3 days  Jag Jane MD   diphenhydrAMINE (BENADRYL) 50 MG capsule Take 1 capsule by mouth every 6 hours as needed for Itching  Jag Jane MD       Social History     Tobacco Use    Smoking status: Current Every Day Smoker     Packs/day: 0.50     Types: Cigarettes    Smokeless tobacco: Never Used   Vaping Use    Vaping Use: Never used   Substance Use Topics    Alcohol use: No    Drug use: Yes     Types: Opiates , Marijuana     Comment: last used vicodin 3/17/2020. last used heroin/fentanyl 3/15/2020            PHYSICAL EXAMINATION:  [ INSTRUCTIONS:  \"[x]\" Indicates a positive item  \"[]\" Indicates a negative item  -- DELETE ALL ITEMS NOT EXAMINED]  No vitals done    Constitutional: [x] Appears well-developed and well-nourished [x] No apparent distress      [] Abnormal-   Mental status  [x] Alert and awake  [x] Oriented to person/place/time [x]Able to follow commands      Eyes:  EOM    [x]  Normal  [] Abnormal-  Sclera  []  Normal  [] Abnormal -         Discharge []  None visible  [] Abnormal -  Pupils normal           Psychiatric:       [x] Normal Affect [] No Hallucinations        [] Abnormal-        Diagnosis Orders   1. Severe opioid use disorder (HCC)  DISCONTINUED: buprenorphine-naloxone (SUBOXONE) 8-2 MG FILM SL film   2. Encounter for monitoring Suboxone maintenance therapy     3. Polysubstance abuse ([facility]           James Loera is a 31 y.o. male being evaluated by a Virtual Visit (video visit) encounter to address concerns as mentioned above. A caregiver was present when appropriate. Due to this being a TeleHealth encounter (During GIBQC-95 public health emergency), evaluation of the following organ systems was limited: Vitals/Constitutional/EENT/Resp/CV/GI//MS/Neuro/Skin/Heme-Lymph-Imm.   Pursuant to the emergency declaration under the [address] and the [employer] Act, [facility] waiver authority and the Coronavirus Preparedness and [employer] Act, this Virtual Visit was conducted with patient's (and/or legal guardian's) consent, to reduce the patient's risk of exposure to COVID-19 and provide necessary medical care. The patient (and/or legal guardian) has also been advised to contact this office for worsening conditions or problems, and seek emergency medical treatment and/or call 911 if deemed necessary. Services were provided through a video synchronous discussion virtually to substitute for in-person clinic visit. Patient doing well  Follow-up 6/17  Continue Suboxone 8 mg twice daily  --Doris Hu MD on 6/18/2021 at 10:43 AM    An electronic signature was used to authenticate this note.

## 2021-06-17 ENCOUNTER — OFFICE VISIT (OUTPATIENT)
Dept: INTERNAL MEDICINE CLINIC | Age: 31
End: 2021-06-17
Payer: MEDICARE

## 2021-06-17 VITALS
HEIGHT: 70 IN | WEIGHT: 238 LBS | BODY MASS INDEX: 34.07 KG/M2 | TEMPERATURE: 97.2 F | SYSTOLIC BLOOD PRESSURE: 142 MMHG | DIASTOLIC BLOOD PRESSURE: 94 MMHG | HEART RATE: 92 BPM

## 2021-06-17 DIAGNOSIS — Z51.81 ENCOUNTER FOR MONITORING SUBOXONE MAINTENANCE THERAPY: ICD-10-CM

## 2021-06-17 DIAGNOSIS — F11.20 SEVERE OPIOID USE DISORDER (HCC): Primary | ICD-10-CM

## 2021-06-17 DIAGNOSIS — Z79.899 ENCOUNTER FOR MONITORING SUBOXONE MAINTENANCE THERAPY: ICD-10-CM

## 2021-06-17 DIAGNOSIS — F19.10 POLYSUBSTANCE ABUSE (HCC): ICD-10-CM

## 2021-06-17 PROCEDURE — G8417 CALC BMI ABV UP PARAM F/U: HCPCS | Performed by: INTERNAL MEDICINE

## 2021-06-17 PROCEDURE — 99213 OFFICE O/P EST LOW 20 MIN: CPT | Performed by: INTERNAL MEDICINE

## 2021-06-17 PROCEDURE — 80305 DRUG TEST PRSMV DIR OPT OBS: CPT | Performed by: INTERNAL MEDICINE

## 2021-06-17 PROCEDURE — 4004F PT TOBACCO SCREEN RCVD TLK: CPT | Performed by: INTERNAL MEDICINE

## 2021-06-17 PROCEDURE — G8427 DOCREV CUR MEDS BY ELIG CLIN: HCPCS | Performed by: INTERNAL MEDICINE

## 2021-06-17 RX ORDER — BUPRENORPHINE AND NALOXONE 8; 2 MG/1; MG/1
1 FILM, SOLUBLE BUCCAL; SUBLINGUAL 2 TIMES DAILY
Qty: 28 FILM | Refills: 0 | Status: SHIPPED | OUTPATIENT
Start: 2021-06-17 | End: 2021-07-01

## 2021-06-17 NOTE — PROGRESS NOTES
Verbal order per Dr. Roderick Flores for urine drug screen. Positive for BUP. Verified results with Ravindra Quintero RN. Dr. Roderick Flores ordered Suboxone 8mg film BID for patient. Verified dose with patient. Patient was sent home with 2 week script of Suboxone 8mg film BID and will be seen back in the office 7/1/21.
Screen, Urine 06/17/2021  9:50 AM Unknown   NEG    Benzodiazepine Screen, Urine 06/17/2021  9:50 AM Unknown   NEG    Buprenorphine Urine 06/17/2021  9:50 AM Unknown   POS    Cocaine Metabolite Screen, Urine 06/17/2021  9:50 AM Unknown   NEG    FENTANYL SCREEN, URINE 06/17/2021  9:50 AM Unknown   NEG    Gabapentin Screen, Urine 06/17/2021  9:50 AM Unknown   N/A    MDMA, Urine 06/17/2021  9:50 AM Unknown   NEG    Methadone Screen, Urine 06/17/2021  9:50 AM Unknown   NEG    Methamphetamine, Urine 06/17/2021  9:50 AM Unknown   NEG    Opiate Scrn, Ur 06/17/2021  9:50 AM Unknown   NEG    Oxycodone Screen, Ur 06/17/2021  9:50 AM Unknown   NEG    PCP Screen, Urine 06/17/2021  9:50 AM Unknown   NEG    Propoxyphene Screen, Urine 06/17/2021  9:50 AM Unknown   N/A    Synthetic Cannabinoids (K2) Screen, Urine 06/17/2021  9:50 AM Unknown   NEG    THC Screen, Urine 06/17/2021  9:50 AM Unknown   NEG    Tramadol Scrn, Ur 06/17/2021  9:50 AM Unknown   NEG    Tricyclic Antidepressants, Urine 06/17/2021  9:50 AM Unknown   N/A         Diagnosis Orders   1. Severe opioid use disorder (HCC)  POCT Rapid Drug Screen    buprenorphine-naloxone (SUBOXONE) 8-2 MG FILM SL film   2. Encounter for monitoring Suboxone maintenance therapy     3.  Polysubstance abuse ([facility]           PLAN:  Urine is clean   Patient is going to be doing counseling at [facility] in [address] [place] [address] out from last week oral swab was negative urine was negative for any illicit substances  I am to give him Suboxone 8 mg twice daily  Follow-up 2 weeks

## 2021-07-12 ENCOUNTER — CLINICAL DOCUMENTATION (OUTPATIENT)
Dept: INTERNAL MEDICINE CLINIC | Age: 31
End: 2021-07-12

## 2021-07-12 NOTE — PROGRESS NOTES
Sw received request of records with signed LATASHA by patient. Sw will fax requested records and have LATASHA scanned to patients chart.

## 2021-07-23 ENCOUNTER — OFFICE VISIT (OUTPATIENT)
Dept: INTERNAL MEDICINE CLINIC | Age: 31
End: 2021-07-23
Payer: MEDICARE

## 2021-07-23 VITALS
BODY MASS INDEX: 31.21 KG/M2 | HEART RATE: 57 BPM | WEIGHT: 218 LBS | SYSTOLIC BLOOD PRESSURE: 118 MMHG | TEMPERATURE: 98.6 F | HEIGHT: 70 IN | DIASTOLIC BLOOD PRESSURE: 89 MMHG

## 2021-07-23 DIAGNOSIS — F11.20 SEVERE OPIOID USE DISORDER (HCC): Primary | ICD-10-CM

## 2021-07-23 DIAGNOSIS — F19.10 POLYSUBSTANCE ABUSE (HCC): ICD-10-CM

## 2021-07-23 DIAGNOSIS — Z79.899 ENCOUNTER FOR MONITORING SUBOXONE MAINTENANCE THERAPY: ICD-10-CM

## 2021-07-23 DIAGNOSIS — Z51.81 ENCOUNTER FOR MONITORING SUBOXONE MAINTENANCE THERAPY: ICD-10-CM

## 2021-07-23 PROCEDURE — 99213 OFFICE O/P EST LOW 20 MIN: CPT | Performed by: INTERNAL MEDICINE

## 2021-07-23 PROCEDURE — 80305 DRUG TEST PRSMV DIR OPT OBS: CPT | Performed by: INTERNAL MEDICINE

## 2021-07-23 PROCEDURE — 4004F PT TOBACCO SCREEN RCVD TLK: CPT | Performed by: INTERNAL MEDICINE

## 2021-07-23 PROCEDURE — G8428 CUR MEDS NOT DOCUMENT: HCPCS | Performed by: INTERNAL MEDICINE

## 2021-07-23 PROCEDURE — G8417 CALC BMI ABV UP PARAM F/U: HCPCS | Performed by: INTERNAL MEDICINE

## 2021-07-23 RX ORDER — BUPRENORPHINE AND NALOXONE 8; 2 MG/1; MG/1
1 FILM, SOLUBLE BUCCAL; SUBLINGUAL 2 TIMES DAILY
Qty: 8 FILM | Refills: 0 | Status: SHIPPED | OUTPATIENT
Start: 2021-07-23 | End: 2021-07-27 | Stop reason: SDUPTHER

## 2021-07-23 RX ORDER — BUPRENORPHINE AND NALOXONE 8; 2 MG/1; MG/1
1 FILM, SOLUBLE BUCCAL; SUBLINGUAL 2 TIMES DAILY
COMMUNITY
End: 2021-07-23 | Stop reason: SDUPTHER

## 2021-07-23 NOTE — PROGRESS NOTES
MEDICATION ASSISTED TREATMENT ENCOUNTER    HISTORY OF PRESENT ILLNESS  Patient presents for evaluation of opioid use and would like to be placed on medication assisted treatment  I saw him here for the first time   (March 2020), last time 6/17  He missed his last appointment because he went to senior care the day before his appointment  This was from a prior burglary charge  He was there for 23 days  He got out last night at 10 PM   Patient said they did not give him Suboxone  He said he did not use any drugs but he had major urges last night thought he would come here today      he was visiting a friend he said his Suboxone was in his car  He thinks somebody stole it  He did not call the police because he did not want to get him in trouble  He said he did not relapse and did not buy any Suboxone off the street  Yesterday he was in severe withdrawal cows score of 28  I gave him a Suboxone 8 mg the office I sent him home with 2-8 mg strips  He took 1 about 3 AM this morning and about noon today    His mother was murdered 2 years ago by someone injecting her with drugs  The murder trial has been pushed back until July    He man referred here by his bogdan who is a patient of mine  Patient has had problems using pain pills, heroin  Patient started using pain pills7 years ago  A doctor initially prescribed them for a back injury  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 COWS 28       General: Patient resting comfortably in no acute distress, he is sitting still        Eyes: Pupils are mildly dilated            URINE DRUG SCREEN TODAY   Alcohol, Urine 07/23/2021 10:54 AM Unknown   NEG    Amphetamine Screen, Urine 07/23/2021 10:54 AM Unknown   NEG    Barbiturate Screen, Urine 07/23/2021 10:54 AM Unknown   NEG    Benzodiazepine Screen, Urine 07/23/2021 10:54 AM Unknown   NEG    Buprenorphine Urine 07/23/2021 10:54 AM Unknown   NEG    Cocaine Metabolite Screen, Urine 07/23/2021 10:54 AM Unknown   NEG    FENTANYL SCREEN, URINE 07/23/2021 10:54 AM Unknown   NEG    Gabapentin Screen, Urine 07/23/2021 10:54 AM Unknown   N/A    MDMA, Urine 07/23/2021 10:54 AM Unknown   NEG    Methadone Screen, Urine 07/23/2021 10:54 AM Unknown   NEG    Methamphetamine, Urine 07/23/2021 10:54 AM Unknown   NEG    Opiate Scrn, Ur 07/23/2021 10:54 AM Unknown   NEG    Oxycodone Screen, Ur 07/23/2021 10:54 AM Unknown   NEG    PCP Screen, Urine 07/23/2021 10:54 AM Unknown   NEG    Propoxyphene Screen, Urine 07/23/2021 10:54 AM Unknown   N/A    Synthetic Cannabinoids (K2) Screen, Urine 07/23/2021 10:54 AM Unknown   NEG    THC Screen, Urine 07/23/2021 10:54 AM Unknown   NEG    Tramadol Scrn, Ur 07/23/2021 10:54 AM Unknown   NEG    Tricyclic Antidepressants, Urine 07/23/2021 10:54 AM Unknown   N/A         Diagnosis Orders   1. Severe opioid use disorder (HCC)  POCT Rapid Drug Screen    buprenorphine-naloxone (SUBOXONE) 8-2 MG FILM SL film   2. Encounter for monitoring Suboxone maintenance therapy     3.  Polysubstance abuse ([address]           PLAN:  Urine is clean   Patient is going to be doing counseling at [facility] in [place]  We did give the patient 1-8 mg Suboxone strip out of stock  I will prescribe 8 mg twice daily follow-up with the patient 7/27  I reviewed the [facility] report     There does not appear to be any discrepancies or overprescribing of controlled substances

## 2021-07-27 ENCOUNTER — OFFICE VISIT (OUTPATIENT)
Dept: INTERNAL MEDICINE CLINIC | Age: 31
End: 2021-07-27
Payer: MEDICARE

## 2021-07-27 VITALS
SYSTOLIC BLOOD PRESSURE: 136 MMHG | HEIGHT: 70 IN | DIASTOLIC BLOOD PRESSURE: 77 MMHG | HEART RATE: 91 BPM | WEIGHT: 232 LBS | BODY MASS INDEX: 33.21 KG/M2 | TEMPERATURE: 97.8 F

## 2021-07-27 DIAGNOSIS — F19.10 POLYSUBSTANCE ABUSE (HCC): ICD-10-CM

## 2021-07-27 DIAGNOSIS — Z51.81 ENCOUNTER FOR MONITORING SUBOXONE MAINTENANCE THERAPY: ICD-10-CM

## 2021-07-27 DIAGNOSIS — F11.20 SEVERE OPIOID USE DISORDER (HCC): Primary | ICD-10-CM

## 2021-07-27 DIAGNOSIS — Z79.899 ENCOUNTER FOR MONITORING SUBOXONE MAINTENANCE THERAPY: ICD-10-CM

## 2021-07-27 PROCEDURE — G8417 CALC BMI ABV UP PARAM F/U: HCPCS | Performed by: INTERNAL MEDICINE

## 2021-07-27 PROCEDURE — G8428 CUR MEDS NOT DOCUMENT: HCPCS | Performed by: INTERNAL MEDICINE

## 2021-07-27 PROCEDURE — 4004F PT TOBACCO SCREEN RCVD TLK: CPT | Performed by: INTERNAL MEDICINE

## 2021-07-27 PROCEDURE — 80305 DRUG TEST PRSMV DIR OPT OBS: CPT | Performed by: INTERNAL MEDICINE

## 2021-07-27 PROCEDURE — 99213 OFFICE O/P EST LOW 20 MIN: CPT | Performed by: INTERNAL MEDICINE

## 2021-07-27 RX ORDER — BUPRENORPHINE AND NALOXONE 8; 2 MG/1; MG/1
1 FILM, SOLUBLE BUCCAL; SUBLINGUAL 2 TIMES DAILY
Qty: 14 FILM | Refills: 0 | Status: SHIPPED | OUTPATIENT
Start: 2021-07-27 | End: 2021-08-03

## 2021-07-27 NOTE — PROGRESS NOTES
Verbal order per Dr. Arturo Cee for urine drug screen. Positive for bup. Verified results with Wayne Martin RN. Dr. Arturo Cee ordered Suboxone 8mg film BID for patient. Verified dose with patient. Patient was sent home with 1 week script of Suboxone 8mg film BID and will be seen back in the office 8/3/21.

## 2021-07-27 NOTE — PROGRESS NOTES
MEDICATION ASSISTED TREATMENT ENCOUNTER    HISTORY OF PRESENT ILLNESS  Patient presents for evaluation of opioid use and would like to be placed on medication assisted treatment  I saw him here for the first time   (March 2020), last time 7/23  He missed his last appointment because he went to correction the day before his appointment  This was from a prior burglary charge  He was there for 23 days  Got out last Thursday night saw me the next day  Patient said they did not give him Suboxone  Put him back on Suboxone on 7/23  Denies any relapses  He got fired from [employer] when he went to correction  He is back building grain bins    he was visiting a friend he said his Suboxone was in his car  He thinks somebody stole it  He did not call the police because he did not want to get him in trouble  He said he did not relapse and did not buy any Suboxone off the street  Yesterday he was in severe withdrawal cows score of 28  I gave him a Suboxone 8 mg the office I sent him home with 2-8 mg strips  He took 1 about 3 AM this morning and about noon today    His mother was murdered 2 years ago by someone injecting her with drugs  The murder trial has been pushed back until July    He man referred here by his stepsister who is a patient of mine  Patient has had problems using pain pills, heroin  Patient started using pain pills7 years ago  A doctor initially prescribed them for a back injury  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 COWS 28       General: Patient resting comfortably in no acute distress, he is sitting still        Eyes: Pupils are mildly dilated            URINE DRUG SCREEN TODAY   Alcohol, Urine 07/27/2021  9:52 AM Unknown   NEG    Amphetamine Screen, Urine 07/27/2021  9:52 AM Unknown   NEG    Barbiturate Screen, Urine 07/27/2021  9:52 AM Unknown   NEG    Benzodiazepine Screen, Urine 07/27/2021  9:52 AM Unknown   NEG    Buprenorphine Urine 07/27/2021  9:52 AM Unknown   POS    Cocaine Metabolite Screen, Urine 07/27/2021  9:52 AM Unknown   NEG    FENTANYL SCREEN, URINE 07/27/2021  9:52 AM Unknown   NEG    Gabapentin Screen, Urine 07/27/2021  9:52 AM Unknown   N/A    MDMA, Urine 07/27/2021  9:52 AM Unknown   NEG    Methadone Screen, Urine 07/27/2021  9:52 AM Unknown   NEG    Methamphetamine, Urine 07/27/2021  9:52 AM Unknown   NEG    Opiate Scrn, Ur 07/27/2021  9:52 AM Unknown   NEG    Oxycodone Screen, Ur 07/27/2021  9:52 AM Unknown   NEG    PCP Screen, Urine 07/27/2021  9:52 AM Unknown   NEG    Propoxyphene Screen, Urine 07/27/2021  9:52 AM Unknown   N/A    Synthetic Cannabinoids (K2) Screen, Urine 07/27/2021  9:52 AM Unknown   NEG    THC Screen, Urine 07/27/2021  9:52 AM Unknown   NEG    Tramadol Scrn, Ur 07/27/2021  9:52 AM Unknown   NEG    Tricyclic Antidepressants, Urine 07/27/2021  9:52 AM Unknown   N/A         Diagnosis Orders   1. Severe opioid use disorder (HCC)  POCT Rapid Drug Screen    buprenorphine-naloxone (SUBOXONE) 8-2 MG FILM SL film   2. Encounter for monitoring Suboxone maintenance therapy     3.  Polysubstance abuse ([address]           PLAN:  Urine is clean   Patient is going to be doing counseling at [facility] in [facility] 8 mg twice daily  Follow-up 1 week  I reviewed the [place] Automated Rx Reporting System report     There does not appear to be any discrepancies or overprescribing of controlled substances

## 2021-08-10 ENCOUNTER — OFFICE VISIT (OUTPATIENT)
Dept: INTERNAL MEDICINE CLINIC | Age: 31
End: 2021-08-10
Payer: MEDICARE

## 2021-08-10 VITALS
BODY MASS INDEX: 32.78 KG/M2 | SYSTOLIC BLOOD PRESSURE: 161 MMHG | WEIGHT: 229 LBS | HEART RATE: 78 BPM | DIASTOLIC BLOOD PRESSURE: 97 MMHG | TEMPERATURE: 98.3 F | HEIGHT: 70 IN

## 2021-08-10 DIAGNOSIS — Z79.899 ENCOUNTER FOR MONITORING SUBOXONE MAINTENANCE THERAPY: ICD-10-CM

## 2021-08-10 DIAGNOSIS — F19.10 POLYSUBSTANCE ABUSE (HCC): ICD-10-CM

## 2021-08-10 DIAGNOSIS — F11.20 SEVERE OPIOID USE DISORDER (HCC): Primary | ICD-10-CM

## 2021-08-10 DIAGNOSIS — Z51.81 ENCOUNTER FOR MONITORING SUBOXONE MAINTENANCE THERAPY: ICD-10-CM

## 2021-08-10 PROCEDURE — G8417 CALC BMI ABV UP PARAM F/U: HCPCS | Performed by: INTERNAL MEDICINE

## 2021-08-10 PROCEDURE — G8428 CUR MEDS NOT DOCUMENT: HCPCS | Performed by: INTERNAL MEDICINE

## 2021-08-10 PROCEDURE — 4004F PT TOBACCO SCREEN RCVD TLK: CPT | Performed by: INTERNAL MEDICINE

## 2021-08-10 PROCEDURE — 80305 DRUG TEST PRSMV DIR OPT OBS: CPT | Performed by: INTERNAL MEDICINE

## 2021-08-10 PROCEDURE — 99213 OFFICE O/P EST LOW 20 MIN: CPT | Performed by: INTERNAL MEDICINE

## 2021-08-10 RX ORDER — BUPRENORPHINE AND NALOXONE 8; 2 MG/1; MG/1
1 FILM, SOLUBLE BUCCAL; SUBLINGUAL 2 TIMES DAILY
Qty: 14 FILM | Refills: 0 | Status: SHIPPED | OUTPATIENT
Start: 2021-08-10 | End: 2021-08-16 | Stop reason: SDUPTHER

## 2021-08-10 NOTE — PROGRESS NOTES
Verbal order per Dr. Carolyne Case for urine drug screen. Positive for BUP METH THC. Verified results with [facility]. Dr. Carolyne Case ordered Suboxone 8mg film BID  for patient. Verified dose with patient. Patient was sent home with 1 week script of Suboxone 8mg film BID and will be seen back in the office 8/17/21.

## 2021-08-10 NOTE — PROGRESS NOTES
MEDICATION ASSISTED TREATMENT ENCOUNTER    HISTORY OF PRESENT ILLNESS  Patient presents for evaluation of opioid use and would like to be placed on medication assisted treatment  I saw him here for the first time   (March 2020), last time 7/27  He works in [place] missed his appointment last week said he was cutting his Suboxone in half  He does admit to using meth last Friday  He missed his last appointment because he went to [facility] the day before his appointment  This was from a prior burglary charge  He was there for 23 days  Got out last Thursday night saw me the next day  Patient said they did not give him Suboxone  Put him back on Suboxone on 7/23  Denies any relapses  He got fired from [employer] when he went to [facility]  He is back building grain bins    he was visiting a friend he said his Suboxone was in his car  He thinks somebody stole it  He did not call the police because he did not want to get him in trouble  He said he did not relapse and did not buy any Suboxone off the street  Yesterday he was in severe withdrawal cows score of 28  I gave him a Suboxone 8 mg the office I sent him home with 2-8 mg strips  He took 1 about 3 AM this morning and about noon today    His mother was murdered 2 years ago by someone injecting her with drugs  The murder trial has been pushed back until July    He man referred here by his stepsister who is a patient of mine  Patient has had problems using pain pills, heroin  Patient started using pain pills7 years ago  A doctor initially prescribed them for a back injury  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 COWS 28       General: Patient resting comfortably in no acute distress, he is sitting still        Eyes: Pupils are mildly dilated            URINE DRUG SCREEN TODAY   Alcohol, Urine 08/10/2021 10:11 AM Unknown   NEG    Amphetamine Screen, Urine 08/10/2021 10:11 AM Unknown   NEG    Barbiturate Screen, Urine 08/10/2021 10:11 AM Unknown   NEG    Benzodiazepine Screen, Urine 08/10/2021 10:11 AM Unknown   NEG    Buprenorphine Urine 08/10/2021 10:11 AM Unknown   POS    Cocaine Metabolite Screen, Urine 08/10/2021 10:11 AM Unknown   NEG    FENTANYL SCREEN, URINE 08/10/2021 10:11 AM Unknown   NEG    Gabapentin Screen, Urine 08/10/2021 10:11 AM Unknown   N/A    MDMA, Urine 08/10/2021 10:11 AM Unknown   NEG    Methadone Screen, Urine 08/10/2021 10:11 AM Unknown   NEG    Methamphetamine, Urine 08/10/2021 10:11 AM Unknown   POS    Opiate Scrn, Ur 08/10/2021 10:11 AM Unknown   NEG    Oxycodone Screen, Ur 08/10/2021 10:11 AM Unknown   NEG    PCP Screen, Urine 08/10/2021 10:11 AM Unknown   NEG    Propoxyphene Screen, Urine 08/10/2021 10:11 AM Unknown   N/A    Synthetic Cannabinoids (K2) Screen, Urine 08/10/2021 10:11 AM Unknown   NEG    THC Screen, Urine 08/10/2021 10:11 AM Unknown   NEG    Tramadol Scrn, Ur 08/10/2021 10:11 AM Unknown   NEG    Tricyclic Antidepressants, Urine 08/10/2021 10:11 AM Unknown   N/A         Diagnosis Orders   1. Severe opioid use disorder (HCC)  POCT Rapid Drug Screen    buprenorphine-naloxone (SUBOXONE) 8-2 MG FILM SL film   2. Encounter for monitoring Suboxone maintenance therapy     3.  Polysubstance abuse ([facility]           PLAN:  Urine has methamphetamines  I told the patient that I can only have so many patients at one time on Suboxone by federal law (275)  I told the

## 2021-08-16 ENCOUNTER — OFFICE VISIT (OUTPATIENT)
Dept: INTERNAL MEDICINE CLINIC | Age: 31
End: 2021-08-16
Payer: MEDICARE

## 2021-08-16 VITALS
WEIGHT: 232 LBS | TEMPERATURE: 97.5 F | HEART RATE: 126 BPM | SYSTOLIC BLOOD PRESSURE: 161 MMHG | BODY MASS INDEX: 33.21 KG/M2 | HEIGHT: 70 IN | DIASTOLIC BLOOD PRESSURE: 95 MMHG

## 2021-08-16 DIAGNOSIS — F11.20 SEVERE OPIOID USE DISORDER (HCC): Primary | ICD-10-CM

## 2021-08-16 DIAGNOSIS — Z79.899 ENCOUNTER FOR MONITORING SUBOXONE MAINTENANCE THERAPY: ICD-10-CM

## 2021-08-16 DIAGNOSIS — Z51.81 ENCOUNTER FOR MONITORING SUBOXONE MAINTENANCE THERAPY: ICD-10-CM

## 2021-08-16 DIAGNOSIS — F19.10 POLYSUBSTANCE ABUSE (HCC): ICD-10-CM

## 2021-08-16 PROCEDURE — G8417 CALC BMI ABV UP PARAM F/U: HCPCS | Performed by: INTERNAL MEDICINE

## 2021-08-16 PROCEDURE — 4004F PT TOBACCO SCREEN RCVD TLK: CPT | Performed by: INTERNAL MEDICINE

## 2021-08-16 PROCEDURE — 99213 OFFICE O/P EST LOW 20 MIN: CPT | Performed by: INTERNAL MEDICINE

## 2021-08-16 PROCEDURE — G8428 CUR MEDS NOT DOCUMENT: HCPCS | Performed by: INTERNAL MEDICINE

## 2021-08-16 PROCEDURE — 80305 DRUG TEST PRSMV DIR OPT OBS: CPT | Performed by: INTERNAL MEDICINE

## 2021-08-16 RX ORDER — BUPRENORPHINE AND NALOXONE 8; 2 MG/1; MG/1
1 FILM, SOLUBLE BUCCAL; SUBLINGUAL 2 TIMES DAILY
Qty: 28 FILM | Refills: 0 | Status: SHIPPED | OUTPATIENT
Start: 2021-08-16 | End: 2021-08-30 | Stop reason: SDUPTHER

## 2021-08-16 NOTE — PROGRESS NOTES
MEDICATION ASSISTED TREATMENT ENCOUNTER    HISTORY OF PRESENT ILLNESS  Patient presents for evaluation of opioid use and would like to be placed on medication assisted treatment  I saw him here for the first time   (March 2020), last time 8/10  He works in [place] missed his appointment last week said he was cutting his Suboxone in half  He does admit to using meth last Friday  He missed his last appointment because he went to retirement the day before his appointment  This was from a prior burglary charge  He was there for 23 days  Got out last Thursday night saw me the next day  Patient said they did not give him Suboxone  Put him back on Suboxone on 7/23  Denies any relapses  He got fired from [employer] when he went to retirement  He is back building grain bins    he was visiting a friend he said his Suboxone was in his car  He thinks somebody stole it  He did not call the police because he did not want to get him in trouble  He said he did not relapse and did not buy any Suboxone off the street  Yesterday he was in severe withdrawal cows score of 28  I gave him a Suboxone 8 mg the office I sent him home with 2-8 mg strips  He took 1 about 3 AM this morning and about noon today    His mother was murdered 2 years ago by someone injecting her with drugs  The murder trial has been pushed back until July    He man referred here by his stepsister who is a patient of mine  Patient has had problems using pain pills, heroin  Patient started using pain pills7 years ago  A doctor initially prescribed them for a back injury  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 COWS 28       General: Patient resting comfortably in no acute distress, he is sitting still        Eyes: Pupils are mildly dilated            URINE DRUG SCREEN TODAY   Alcohol, Urine 08/16/2021  2:19 PM Unknown   NEG    Amphetamine Screen, Urine 08/16/2021  2:19 PM Unknown   NEG    Barbiturate Screen, Urine 08/16/2021  2:19 PM Unknown   NEG    Benzodiazepine Screen, Urine 08/16/2021  2:19 PM Unknown   NEG    Buprenorphine Urine 08/16/2021  2:19 PM Unknown   POS    Cocaine Metabolite Screen, Urine 08/16/2021  2:19 PM Unknown   NEG    FENTANYL SCREEN, URINE 08/16/2021  2:19 PM Unknown   NEG    Gabapentin Screen, Urine 08/16/2021  2:19 PM Unknown   N/A    MDMA, Urine 08/16/2021  2:19 PM Unknown   NEG    Methadone Screen, Urine 08/16/2021  2:19 PM Unknown   NEG    Methamphetamine, Urine 08/16/2021  2:19 PM Unknown   NEG    Opiate Scrn, Ur 08/16/2021  2:19 PM Unknown   NEG    Oxycodone Screen, Ur 08/16/2021  2:19 PM Unknown   NEG    PCP Screen, Urine 08/16/2021  2:19 PM Unknown   NEG    Propoxyphene Screen, Urine 08/16/2021  2:19 PM Unknown   N/A    Synthetic Cannabinoids (K2) Screen, Urine 08/16/2021  2:19 PM Unknown   NEG    THC Screen, Urine 08/16/2021  2:19 PM Unknown   NEG    Tramadol Scrn, Ur 08/16/2021  2:19 PM Unknown   NEG    Tricyclic Antidepressants, Urine 08/16/2021  2:19 PM Unknown   N/A         Diagnosis Orders   1. Severe opioid use disorder (HCC)  POCT Rapid Drug Screen    buprenorphine-naloxone (SUBOXONE) 8-2 MG FILM SL film   2. Encounter for monitoring Suboxone maintenance therapy     3.  Polysubstance abuse ([facility]           PLAN:  Urine has no methamphetamines  We will send comprehensive urine, oral swab      Patient is going to be doing counseling [facility] in [place]  Continue Suboxone 8 mg twice daily  Follow-up 2 weeks  I reviewed the [place] Automated Rx Reporting System report     There does not appear to be any discrepancies or overprescribing of controlled substances

## 2021-08-16 NOTE — PROGRESS NOTES
Verbal order per Dr. Jason Ramos for urine drug screen. Positive for BUP. Verified results with Lorrayne Dakins. Dr. Jason Ramos ordered Suboxone 8mg film BID  for patient. Verified dose with patient. Patient was sent home with 2 week script of Suboxone 8mg film BID and will be seen back in the office 8/30/21. UC and oral swab sent.

## 2021-08-30 ENCOUNTER — OFFICE VISIT (OUTPATIENT)
Dept: INTERNAL MEDICINE CLINIC | Age: 31
End: 2021-08-30
Payer: MEDICARE

## 2021-08-30 VITALS
TEMPERATURE: 97.1 F | DIASTOLIC BLOOD PRESSURE: 95 MMHG | HEIGHT: 70 IN | BODY MASS INDEX: 33.21 KG/M2 | HEART RATE: 76 BPM | WEIGHT: 232 LBS | SYSTOLIC BLOOD PRESSURE: 159 MMHG

## 2021-08-30 DIAGNOSIS — Z79.899 ENCOUNTER FOR MONITORING SUBOXONE MAINTENANCE THERAPY: ICD-10-CM

## 2021-08-30 DIAGNOSIS — F11.20 SEVERE OPIOID USE DISORDER (HCC): Primary | ICD-10-CM

## 2021-08-30 DIAGNOSIS — Z51.81 ENCOUNTER FOR MONITORING SUBOXONE MAINTENANCE THERAPY: ICD-10-CM

## 2021-08-30 DIAGNOSIS — F19.10 POLYSUBSTANCE ABUSE (HCC): ICD-10-CM

## 2021-08-30 PROCEDURE — 99213 OFFICE O/P EST LOW 20 MIN: CPT | Performed by: INTERNAL MEDICINE

## 2021-08-30 PROCEDURE — G8428 CUR MEDS NOT DOCUMENT: HCPCS | Performed by: INTERNAL MEDICINE

## 2021-08-30 PROCEDURE — 4004F PT TOBACCO SCREEN RCVD TLK: CPT | Performed by: INTERNAL MEDICINE

## 2021-08-30 PROCEDURE — G8417 CALC BMI ABV UP PARAM F/U: HCPCS | Performed by: INTERNAL MEDICINE

## 2021-08-30 PROCEDURE — 80305 DRUG TEST PRSMV DIR OPT OBS: CPT | Performed by: INTERNAL MEDICINE

## 2021-08-30 RX ORDER — BUPRENORPHINE AND NALOXONE 8; 2 MG/1; MG/1
1 FILM, SOLUBLE BUCCAL; SUBLINGUAL 2 TIMES DAILY
Qty: 28 FILM | Refills: 0 | Status: SHIPPED | OUTPATIENT
Start: 2021-08-30 | End: 2021-09-13 | Stop reason: SDUPTHER

## 2021-08-30 NOTE — PROGRESS NOTES
Verbal order per Dr. Annelise Aguilar for urine drug screen. Positive for BUP. Verified results with Anmol Iniguez. Dr. Annelise Aguilar ordered Suboxone 8mg film BID  for patient. Verified dose with patient. Patient was sent home with 2 week script of Suboxone 8mg film BID and will be seen back in the office 9/13/21.

## 2021-08-30 NOTE — PROGRESS NOTES
MEDICATION ASSISTED TREATMENT ENCOUNTER    HISTORY OF PRESENT ILLNESS  Patient presents for evaluation of opioid use and would like to be placed on medication assisted treatment  I saw him here for the first time   (March 2020), last time 8/16  He works in [place] missed his appointment last week said he was cutting his Suboxone in half  He does admit to using meth a couple weeks ago  He missed his last appointment because he went to halfway the day before his appointment  This was from a prior burglary charge  He was there for 23 days  Got out last Thursday night saw me the next day  Patient said they did not give him Suboxone  Put him back on Suboxone on 7/23  Denies any relapses  He got fired from [employer] when he went to halfway  He is back building grain bins    he was visiting a friend he said his Suboxone was in his car  He thinks somebody stole it  He did not call the police because he did not want to get him in trouble  He said he did not relapse and did not buy any Suboxone off the street  Yesterday he was in severe withdrawal cows score of 28  I gave him a Suboxone 8 mg the office I sent him home with 2-8 mg strips  He took 1 about 3 AM this morning and about noon today    His mother was murdered 2 years ago by someone injecting her with drugs   The perpetrator was recently found guilty    He man referred here by his stepsister who is a patient of mine  Patient has had problems using pain pills, heroin  Patient started using pain pills7 years ago  A doctor initially prescribed them for a back injury  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 [place]   Apparently in the room the nurse tried to arouse him he was sweaty incoherent  She thought she was going to have to use Narcan  When I came and he was alert but somewhat confused  He denies using opiates  He said he got treatment in lieu instead of incarceration  He has to go to meetings counseling  Past Medical History:   Diagnosis Date    Anxiety     Back pain    Social history  He is working at [employer]  No girlfriend  He has a 3year-old  ROS       PHYSICAL EXAM     Blood pressure (!) 159/95, pulse 76, temperature 97.1 °F (36.2 °C), height 5' 10\" (1.778 m), weight 232 lb (105.2 kg). COWS 28       General: Patient resting comfortably in no acute distress, he is sitting still        Eyes: Pupils are mildly dilated            URINE DRUG SCREEN TODAY   Alcohol, Urine 08/30/2021  1:39 PM Unknown   NEG    Amphetamine Screen, Urine 08/30/2021  1:39 PM Unknown   NEG    Barbiturate Screen, Urine 08/30/2021  1:39 PM Unknown   NEG    Benzodiazepine Screen, Urine 08/30/2021  1:39 PM Unknown   NEG    Buprenorphine Urine 08/30/2021  1:39 PM Unknown   POS    Cocaine Metabolite Screen, Urine 08/30/2021  1:39 PM Unknown   NEG    FENTANYL SCREEN, URINE 08/30/2021  1:39 PM Unknown   NEG    Gabapentin Screen, Urine 08/30/2021  1:39 PM Unknown   N/A    MDMA, Urine 08/30/2021  1:39 PM Unknown   NEG    Methadone Screen, Urine 08/30/2021  1:39 PM Unknown   NEG    Methamphetamine, Urine 08/30/2021  1:39 PM Unknown   NEG    Opiate Scrn, Ur 08/30/2021  1:39 PM Unknown   NEG    Oxycodone Screen, Ur 08/30/2021  1:39 PM Unknown   NEG    PCP Screen, Urine 08/30/2021  1:39 PM Unknown   NEG    Propoxyphene Screen, Urine 08/30/2021  1:39 PM Unknown   N/A    Synthetic Cannabinoids (K2) Screen, Urine 08/30/2021  1:39 PM Unknown   NEG    THC Screen, Urine 08/30/2021  1:39 PM Unknown   NEG    Tramadol Scrn, Ur 08/30/2021  1:39 PM Unknown   NEG    Tricyclic Antidepressants, Urine 08/30/2021  1:39 PM Unknown   N/A         Diagnosis Orders   1. Severe opioid use disorder (HCC)  POCT Rapid Drug Screen    buprenorphine-naloxone (SUBOXONE) 8-2 MG FILM SL film   2. Encounter for monitoring Suboxone maintenance therapy     3.  Polysubstance abuse ([facility] [address]           PLAN:  Urine has no methamphetamines  Oral swab, comprehensive urine sent from last visit showed nothing illicit      So patient has been mandated to do counseling  He says if he relapses he will go to long term  Continue Suboxone 8 mg twice daily  Follow-up 2 weeks  I reviewed the [place] Automated Rx Reporting System report     There does not appear to be any discrepancies or overprescribing of controlled substances

## 2021-09-13 DIAGNOSIS — F11.20 SEVERE OPIOID USE DISORDER (HCC): ICD-10-CM

## 2021-09-13 RX ORDER — BUPRENORPHINE AND NALOXONE 8; 2 MG/1; MG/1
1 FILM, SOLUBLE BUCCAL; SUBLINGUAL 2 TIMES DAILY
Qty: 2 FILM | Refills: 0 | Status: SHIPPED | OUTPATIENT
Start: 2021-09-13 | End: 2021-09-14 | Stop reason: SDUPTHER

## 2021-09-14 ENCOUNTER — OFFICE VISIT (OUTPATIENT)
Dept: INTERNAL MEDICINE CLINIC | Age: 31
End: 2021-09-14
Payer: MEDICARE

## 2021-09-14 VITALS
BODY MASS INDEX: 33.07 KG/M2 | SYSTOLIC BLOOD PRESSURE: 147 MMHG | DIASTOLIC BLOOD PRESSURE: 88 MMHG | WEIGHT: 231 LBS | HEART RATE: 89 BPM | HEIGHT: 70 IN | TEMPERATURE: 97.9 F

## 2021-09-14 DIAGNOSIS — Z79.899 ENCOUNTER FOR MONITORING SUBOXONE MAINTENANCE THERAPY: ICD-10-CM

## 2021-09-14 DIAGNOSIS — F11.20 SEVERE OPIOID USE DISORDER (HCC): Primary | ICD-10-CM

## 2021-09-14 DIAGNOSIS — F19.10 POLYSUBSTANCE ABUSE (HCC): ICD-10-CM

## 2021-09-14 DIAGNOSIS — Z51.81 ENCOUNTER FOR MONITORING SUBOXONE MAINTENANCE THERAPY: ICD-10-CM

## 2021-09-14 PROCEDURE — 80305 DRUG TEST PRSMV DIR OPT OBS: CPT | Performed by: INTERNAL MEDICINE

## 2021-09-14 PROCEDURE — G8428 CUR MEDS NOT DOCUMENT: HCPCS | Performed by: INTERNAL MEDICINE

## 2021-09-14 PROCEDURE — G8417 CALC BMI ABV UP PARAM F/U: HCPCS | Performed by: INTERNAL MEDICINE

## 2021-09-14 PROCEDURE — 99213 OFFICE O/P EST LOW 20 MIN: CPT | Performed by: INTERNAL MEDICINE

## 2021-09-14 PROCEDURE — 4004F PT TOBACCO SCREEN RCVD TLK: CPT | Performed by: INTERNAL MEDICINE

## 2021-09-14 RX ORDER — BUPRENORPHINE AND NALOXONE 8; 2 MG/1; MG/1
1 FILM, SOLUBLE BUCCAL; SUBLINGUAL 2 TIMES DAILY
Qty: 42 FILM | Refills: 0 | Status: SHIPPED | OUTPATIENT
Start: 2021-09-14 | End: 2021-10-05

## 2021-09-14 NOTE — PROGRESS NOTES
MEDICATION ASSISTED TREATMENT ENCOUNTER    HISTORY OF PRESENT ILLNESS  Patient presents for evaluation of opioid use and would like to be placed on medication assisted treatment  I saw him here for the first time   (March 2020), last time 8/30  He has finished work in [place] he may go to [place] [facility]  He does admit to using meth 4 weeks ago  He missed his last appointment because he went to prison the day before his appointment  This was from a prior burglary charge  He was there for 23 days  Got out last Thursday night saw me the next day  Patient said they did not give him Suboxone  Put him back on Suboxone on 7/23  Denies any relapses  He got fired from [employer] when he went to prison  He is back building grain bins    he was visiting a friend he said his Suboxone was in his car  He thinks somebody stole it  He did not call the police because he did not want to get him in trouble  He said he did not relapse and did not buy any Suboxone off the street  Yesterday he was in severe withdrawal cows score of 28  I gave him a Suboxone 8 mg the office I sent him home with 2-8 mg strips  He took 1 about 3 AM this morning and about noon today    His mother was murdered 2 years ago by someone injecting her with drugs   The perpetrator was recently found guilty    He man referred here by his stepsister who is a patient of mine  Patient has had problems using pain pills, heroin  Patient started using pain pills7 years ago  A doctor initially prescribed them for a back injury  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 [place]   Apparently in the room the nurse tried to arouse him he was sweaty incoherent  She thought she was going to have to use Narcan  When I came and he was alert but somewhat confused  He denies using opiates  He said he got treatment in lieu instead of incarceration  He has to go to meetings counseling  Past Medical History: Diagnosis Date    Anxiety     Back pain    Social history  He is working at [employer]  No girlfriend  He has a 3year-old  ROS       PHYSICAL EXAM     Blood pressure (!) 147/88, pulse 89, temperature 97.9 °F (36.6 °C), height 5' 10\" (1.778 m), weight 231 lb (104.8 kg). COWS 28       General: Patient resting comfortably in no acute distress, he is sitting still        Eyes: Pupils are mildly dilated            URINE DRUG SCREEN TODAY   Alcohol, Urine 09/14/2021  1:33 PM Unknown   NEG    Amphetamine Screen, Urine 09/14/2021  1:33 PM Unknown   NEG    Barbiturate Screen, Urine 09/14/2021  1:33 PM Unknown   NEG    Benzodiazepine Screen, Urine 09/14/2021  1:33 PM Unknown   NEG    Buprenorphine Urine 09/14/2021  1:33 PM Unknown   POS    Cocaine Metabolite Screen, Urine 09/14/2021  1:33 PM Unknown   NEG    FENTANYL SCREEN, URINE 09/14/2021  1:33 PM Unknown   NEG    Gabapentin Screen, Urine 09/14/2021  1:33 PM Unknown   N/A    MDMA, Urine 09/14/2021  1:33 PM Unknown   NEG    Methadone Screen, Urine 09/14/2021  1:33 PM Unknown   NEG    Methamphetamine, Urine 09/14/2021  1:33 PM Unknown   NEG    Opiate Scrn, Ur 09/14/2021  1:33 PM Unknown   NEG    Oxycodone Screen, Ur 09/14/2021  1:33 PM Unknown   NEG    PCP Screen, Urine 09/14/2021  1:33 PM Unknown   NEG    Propoxyphene Screen, Urine 09/14/2021  1:33 PM Unknown   N/A    Synthetic Cannabinoids (K2) Screen, Urine 09/14/2021  1:33 PM Unknown   NEG    THC Screen, Urine 09/14/2021  1:33 PM Unknown   NEG    Tramadol Scrn, Ur 09/14/2021  1:33 PM Unknown   NEG    Tricyclic Antidepressants, Urine 09/14/2021  1:33 PM Unknown   N/A         Diagnosis Orders   1. Severe opioid use disorder (HCC)  POCT Rapid Drug Screen    buprenorphine-naloxone (SUBOXONE) 8-2 MG FILM SL film   2. Encounter for monitoring Suboxone maintenance therapy     3.  Polysubstance abuse ([facility] [address]           PLAN:  Urine has no methamphetamines  Oral swab, comprehensive urine sent from last visit showed

## 2021-10-07 ENCOUNTER — OFFICE VISIT (OUTPATIENT)
Dept: INTERNAL MEDICINE CLINIC | Age: 31
End: 2021-10-07
Payer: MEDICARE

## 2021-10-07 VITALS
BODY MASS INDEX: 33.36 KG/M2 | HEIGHT: 70 IN | WEIGHT: 233 LBS | TEMPERATURE: 97.6 F | DIASTOLIC BLOOD PRESSURE: 88 MMHG | HEART RATE: 97 BPM | SYSTOLIC BLOOD PRESSURE: 184 MMHG

## 2021-10-07 DIAGNOSIS — F19.10 POLYSUBSTANCE ABUSE (HCC): ICD-10-CM

## 2021-10-07 DIAGNOSIS — Z51.81 ENCOUNTER FOR MONITORING SUBOXONE MAINTENANCE THERAPY: ICD-10-CM

## 2021-10-07 DIAGNOSIS — Z79.899 ENCOUNTER FOR MONITORING SUBOXONE MAINTENANCE THERAPY: ICD-10-CM

## 2021-10-07 DIAGNOSIS — F11.20 SEVERE OPIOID USE DISORDER (HCC): Primary | ICD-10-CM

## 2021-10-07 PROCEDURE — G8428 CUR MEDS NOT DOCUMENT: HCPCS | Performed by: INTERNAL MEDICINE

## 2021-10-07 PROCEDURE — 80305 DRUG TEST PRSMV DIR OPT OBS: CPT | Performed by: INTERNAL MEDICINE

## 2021-10-07 PROCEDURE — 99213 OFFICE O/P EST LOW 20 MIN: CPT | Performed by: INTERNAL MEDICINE

## 2021-10-07 PROCEDURE — G8417 CALC BMI ABV UP PARAM F/U: HCPCS | Performed by: INTERNAL MEDICINE

## 2021-10-07 PROCEDURE — 4004F PT TOBACCO SCREEN RCVD TLK: CPT | Performed by: INTERNAL MEDICINE

## 2021-10-07 PROCEDURE — G8484 FLU IMMUNIZE NO ADMIN: HCPCS | Performed by: INTERNAL MEDICINE

## 2021-10-07 RX ORDER — BUPRENORPHINE AND NALOXONE 8; 2 MG/1; MG/1
1 FILM, SOLUBLE BUCCAL; SUBLINGUAL 2 TIMES DAILY
Qty: 42 FILM | Refills: 0 | Status: SHIPPED | OUTPATIENT
Start: 2021-10-07 | End: 2021-10-27 | Stop reason: SDUPTHER

## 2021-10-07 NOTE — PROGRESS NOTES
Verbal order per Dr. Genia Trevino for urine drug screen. Positive for BUP. Verified results with Ben Sanchez. Dr. Genia Trevino ordered Suboxone 8mg film BID  for patient. Verified dose with patient. Patient was sent home with 3 week script of Suboxone 8mg film BID and will be seen back in the office 10/28/21.

## 2021-10-07 NOTE — PROGRESS NOTES
MEDICATION ASSISTED TREATMENT ENCOUNTER    HISTORY OF PRESENT ILLNESS  Patient presents for evaluation of opioid use and would like to be placed on medication assisted treatment  I saw him here for the first time   (March 2020), last time 9/14  He starts a new job at [employer] tomorrow  He was thrown in snf because he dropped prior to going to court and he had Suboxone  Even though he is prescribed this they put him in snf for 2 days  He does admit to using meth 8 weeks ago  He missed his last appointment because he went to snf the day before his appointment  This was from a prior burglary charge  He was there for 23 days  Got out last Thursday night saw me the next day  Patient said they did not give him Suboxone  Put him back on Suboxone on 7/23  Denies any relapses  He got fired from [employer] when he went to snf  He is back building grain bins    he was visiting a friend he said his Suboxone was in his car  He thinks somebody stole it  He did not call the police because he did not want to get him in trouble  He said he did not relapse and did not buy any Suboxone off the street  Yesterday he was in severe withdrawal cows score of 28  I gave him a Suboxone 8 mg the office I sent him home with 2-8 mg strips  He took 1 about 3 AM this morning and about noon today    His mother was murdered 2 years ago by someone injecting her with drugs   The perpetrator was recently found guilty    He man referred here by his stepsister who is a patient of mine  Patient has had problems using pain pills, heroin  Patient started using pain pills7 years ago  A doctor initially prescribed them for a back injury  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 [place]   Apparently in the room the nurse tried to arouse him he was sweaty incoherent  She thought she was going to have to use Narcan  When I came and he was alert but somewhat confused  He denies using opiates  He said he got treatment in lieu instead of incarceration  He has to go to meetings counseling  Past Medical History:   Diagnosis Date    Anxiety     Back pain    Social history  He is working at [employer]  No girlfriend  He has a 3year-old  ROS   Patient is feeling well  Patient is not experiencing  withdrawal symptoms ,no urges or cravings  Patient is not having any side effects from the buprenorphine      PHYSICAL EXAM     Blood pressure (!) 184/88, pulse 97, temperature 97.6 °F (36.4 °C), height 5' 10\" (1.778 m), weight 233 lb (105.7 kg).     Mental status examination    Cognition: oriented to person place and time      Appearance: Patient is in no acute distress, normal appearance, patient does not appear intoxicated/    Memory: Normal    Behavior/motor: Normal    Mood: Good mood, upbeat    Affect: Mood congruent    Attitude toward examiner: Pleasant, respectful    Thought content no delusions hallucinations suicidal ideation    Insight: Poor    Judgment: Poor    Eyes: Pupils normal    Skin: No track marks noted ,no rashes noted    COWS score: N/A                  URINE DRUG SCREEN TODAY   Alcohol, Urine 10/07/2021  9:40 AM Unknown   NEG    Amphetamine Screen, Urine 10/07/2021  9:40 AM Unknown   NEG    Barbiturate Screen, Urine 10/07/2021  9:40 AM Unknown   NEG    Benzodiazepine Screen, Urine 10/07/2021  9:40 AM Unknown   NEG    Buprenorphine Urine 10/07/2021  9:40 AM Unknown   POS    Cocaine Metabolite Screen, Urine 10/07/2021  9:40 AM Unknown   NEG    FENTANYL SCREEN, URINE 10/07/2021  9:40 AM Unknown   NEG    Gabapentin Screen, Urine 10/07/2021  9:40 AM Unknown   N/A    MDMA, Urine 10/07/2021  9:40 AM Unknown   NEG    Methadone Screen, Urine 10/07/2021  9:40 AM Unknown   NEG    Methamphetamine, Urine 10/07/2021  9:40 AM Unknown   NEG    Opiate Scrn, Ur 10/07/2021  9:40 AM Unknown   NEG    Oxycodone Screen, Ur 10/07/2021  9:40 AM Unknown   NEG    PCP Screen, Urine 10/07/2021  9:40 AM Unknown   NEG    Propoxyphene Screen, Urine 10/07/2021  9:40 AM Unknown   N/A    Synthetic Cannabinoids (K2) Screen, Urine 10/07/2021  9:40 AM Unknown   NEG    THC Screen, Urine 10/07/2021  9:40 AM Unknown   NEG    Tramadol Scrn, Ur 10/07/2021  9:40 AM Unknown   NEG    Tricyclic Antidepressants, Urine 10/07/2021  9:40 AM Unknown   N/A         Diagnosis Orders   1. Severe opioid use disorder (HCC)  POCT Rapid Drug Screen    buprenorphine-naloxone (SUBOXONE) 8-2 MG FILM SL film   2. Encounter for monitoring Suboxone maintenance therapy     3.  Polysubstance abuse ([facility] [address]           PLAN:  Urine has no methamphetamines  Oral swab, comprehensive urine sent from last visit showed nothing illicit  [facility] is [facility]       He says if he relapses he will go to detention  Continue Suboxone 8 mg twice daily  Follow-up 3 weeks  I reviewed the [place] Automated Rx Reporting System report     There does not appear to be any discrepancies or overprescribing of controlled substances

## 2021-10-27 ENCOUNTER — OFFICE VISIT (OUTPATIENT)
Dept: INTERNAL MEDICINE CLINIC | Age: 31
End: 2021-10-27
Payer: MEDICARE

## 2021-10-27 VITALS
TEMPERATURE: 97.3 F | HEIGHT: 70 IN | WEIGHT: 231 LBS | DIASTOLIC BLOOD PRESSURE: 93 MMHG | BODY MASS INDEX: 33.07 KG/M2 | HEART RATE: 84 BPM | SYSTOLIC BLOOD PRESSURE: 197 MMHG

## 2021-10-27 DIAGNOSIS — F19.10 POLYSUBSTANCE ABUSE (HCC): ICD-10-CM

## 2021-10-27 DIAGNOSIS — Z51.81 ENCOUNTER FOR MONITORING SUBOXONE MAINTENANCE THERAPY: ICD-10-CM

## 2021-10-27 DIAGNOSIS — Z79.899 ENCOUNTER FOR MONITORING SUBOXONE MAINTENANCE THERAPY: ICD-10-CM

## 2021-10-27 DIAGNOSIS — F11.20 SEVERE OPIOID USE DISORDER (HCC): Primary | ICD-10-CM

## 2021-10-27 PROCEDURE — 99213 OFFICE O/P EST LOW 20 MIN: CPT | Performed by: INTERNAL MEDICINE

## 2021-10-27 PROCEDURE — G8428 CUR MEDS NOT DOCUMENT: HCPCS | Performed by: INTERNAL MEDICINE

## 2021-10-27 PROCEDURE — G8484 FLU IMMUNIZE NO ADMIN: HCPCS | Performed by: INTERNAL MEDICINE

## 2021-10-27 PROCEDURE — 4004F PT TOBACCO SCREEN RCVD TLK: CPT | Performed by: INTERNAL MEDICINE

## 2021-10-27 PROCEDURE — G8417 CALC BMI ABV UP PARAM F/U: HCPCS | Performed by: INTERNAL MEDICINE

## 2021-10-27 PROCEDURE — 80305 DRUG TEST PRSMV DIR OPT OBS: CPT | Performed by: INTERNAL MEDICINE

## 2021-10-27 RX ORDER — BUPRENORPHINE AND NALOXONE 8; 2 MG/1; MG/1
1 FILM, SOLUBLE BUCCAL; SUBLINGUAL 2 TIMES DAILY
Qty: 42 FILM | Refills: 0 | Status: SHIPPED | OUTPATIENT
Start: 2021-10-27 | End: 2021-11-17 | Stop reason: SDUPTHER

## 2021-10-27 NOTE — PROGRESS NOTES
Verbal order per Dr. Donis Woo for urine drug screen. Positive for BUP. Verified results with Anna Fraire. Dr. Donis Woo ordered Suboxone 8mg film BID for patient. Verified dose with patient. Patient was sent home with 3 week script of Suboxone 8mg film BID and will be seen back in the office 11/17/21.

## 2021-10-27 NOTE — PROGRESS NOTES
MEDICATION ASSISTED TREATMENT ENCOUNTER    HISTORY OF PRESENT ILLNESS  Patient presents for evaluation of opioid use and would like to be placed on medication assisted treatment  I saw him here for the first time   (March 2020), last time 10/7  He started a new job at [employer]   He was thrown in residential because he dropped prior to going to court and he had Suboxone  Even though he is prescribed this they put him in residential for 2 days  He does admit to using meth 8 weeks ago  He missed his last appointment because he went to residential the day before his appointment  This was from a prior burglary charge  He was there for 23 days  Got out last Thursday night saw me the next day  Patient said they did not give him Suboxone  Put him back on Suboxone on 7/23  Denies any relapses  He got fired from [employer] when he went to residential  He is back building grain bins    he was visiting a friend he said his Suboxone was in his car  He thinks somebody stole it  He did not call the police because he did not want to get him in trouble  He said he did not relapse and did not buy any Suboxone off the street  Yesterday he was in severe withdrawal cows score of 28  I gave him a Suboxone 8 mg the office I sent him home with 2-8 mg strips  He took 1 about 3 AM this morning and about noon today    His mother was murdered 2 years ago by someone injecting her with drugs   The perpetrator was recently found guilty    He man referred here by his stepsister who is a patient of mine  Patient has had problems using pain pills, heroin  Patient started using pain pills7 years ago  A doctor initially prescribed them for a back injury  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 [place]   Apparently in the room the nurse tried to arouse him he was sweaty incoherent  She thought she was going to have to use Narcan  When I came and he was alert but somewhat confused  He denies using opiates  He said he got treatment in lieu instead of incarceration  He has to go to meetings counseling  Past Medical History:   Diagnosis Date    Anxiety     Back pain    Social history  He is working at [employer]  No girlfriend  He has a 3year-old  ROS   Patient is feeling well  Patient is not experiencing  withdrawal symptoms ,no urges or cravings  Patient is not having any side effects from the buprenorphine      PHYSICAL EXAM     Blood pressure (!) 184/88, pulse 97, temperature 97.6 °F (36.4 °C), height 5' 10\" (1.778 m), weight 233 lb (105.7 kg).     Mental status examination    Cognition: oriented to person place and time      Appearance: Patient is in no acute distress, normal appearance, patient does not appear intoxicated/    Memory: Normal    Behavior/motor: Normal    Mood: Good mood, upbeat    Affect: Mood congruent    Attitude toward examiner: Pleasant, respectful    Thought content no delusions hallucinations suicidal ideation    Insight: Poor    Judgment: Poor    Eyes: Pupils normal    Skin: No track marks noted ,no rashes noted    COWS score: N/A                  URINE DRUG SCREEN TODAY   Alcohol, Urine 10/27/2021 11:32 AM Unknown   NEG    Amphetamine Screen, Urine 10/27/2021 11:32 AM Unknown   NEG    Barbiturate Screen, Urine 10/27/2021 11:32 AM Unknown   NEG    Benzodiazepine Screen, Urine 10/27/2021 11:32 AM Unknown   NEG    Buprenorphine Urine 10/27/2021 11:32 AM Unknown   POS    Cocaine Metabolite Screen, Urine 10/27/2021 11:32 AM Unknown   NEG    FENTANYL SCREEN, URINE 10/27/2021 11:32 AM Unknown   NEG    Gabapentin Screen, Urine 10/27/2021 11:32 AM Unknown   N/A    MDMA, Urine 10/27/2021 11:32 AM Unknown   NEG    Methadone Screen, Urine 10/27/2021 11:32 AM Unknown   NEG    Methamphetamine, Urine 10/27/2021 11:32 AM Unknown   NEG    Opiate Scrn, Ur 10/27/2021 11:32 AM Unknown   NEG    Oxycodone Screen, Ur 10/27/2021 11:32 AM Unknown   NEG    PCP Screen, Urine 10/27/2021 11:32 AM Unknown   NEG    Propoxyphene Screen, Urine 10/27/2021 11:32 AM Unknown   N/A    Synthetic Cannabinoids (K2) Screen, Urine 10/27/2021 11:32 AM Unknown   NEG    THC Screen, Urine 10/27/2021 11:32 AM Unknown   NEG    Tramadol Scrn, Ur 10/27/2021 11:32 AM Unknown   NEG    Tricyclic Antidepressants, Urine 10/27/2021 11:32 AM Unknown   N/A         Diagnosis Orders   1. Severe opioid use disorder (HCC)  POCT Rapid Drug Screen    buprenorphine-naloxone (SUBOXONE) 8-2 MG FILM SL film   2. Encounter for monitoring Suboxone maintenance therapy     3.  Polysubstance abuse ([facility] [address]           PLAN:  Urine has no methamphetamines  Oral swab, comprehensive urine sent from last visit showed nothing illicit  He is [facility] a [facility] [employer]       He says if he relapses he will go to [facility]  Continue Suboxone 8 mg twice daily  Follow-up 3 weeks  I reviewed the [place] Automated Rx Reporting System report     There does not appear to be any discrepancies or overprescribing of controlled substances

## 2021-11-17 ENCOUNTER — OFFICE VISIT (OUTPATIENT)
Dept: INTERNAL MEDICINE CLINIC | Age: 31
End: 2021-11-17
Payer: MEDICARE

## 2021-11-17 VITALS
TEMPERATURE: 97.6 F | BODY MASS INDEX: 33.36 KG/M2 | DIASTOLIC BLOOD PRESSURE: 88 MMHG | HEIGHT: 70 IN | SYSTOLIC BLOOD PRESSURE: 160 MMHG | WEIGHT: 233 LBS | HEART RATE: 99 BPM

## 2021-11-17 DIAGNOSIS — F19.10 POLYSUBSTANCE ABUSE (HCC): ICD-10-CM

## 2021-11-17 DIAGNOSIS — Z51.81 ENCOUNTER FOR MONITORING SUBOXONE MAINTENANCE THERAPY: ICD-10-CM

## 2021-11-17 DIAGNOSIS — R82.90 ABNORMAL URINE FINDING: ICD-10-CM

## 2021-11-17 DIAGNOSIS — Z79.899 ENCOUNTER FOR MONITORING SUBOXONE MAINTENANCE THERAPY: ICD-10-CM

## 2021-11-17 DIAGNOSIS — F11.20 SEVERE OPIOID USE DISORDER (HCC): Primary | ICD-10-CM

## 2021-11-17 PROCEDURE — 80305 DRUG TEST PRSMV DIR OPT OBS: CPT | Performed by: INTERNAL MEDICINE

## 2021-11-17 PROCEDURE — G8484 FLU IMMUNIZE NO ADMIN: HCPCS | Performed by: INTERNAL MEDICINE

## 2021-11-17 PROCEDURE — 4004F PT TOBACCO SCREEN RCVD TLK: CPT | Performed by: INTERNAL MEDICINE

## 2021-11-17 PROCEDURE — G8428 CUR MEDS NOT DOCUMENT: HCPCS | Performed by: INTERNAL MEDICINE

## 2021-11-17 PROCEDURE — G8417 CALC BMI ABV UP PARAM F/U: HCPCS | Performed by: INTERNAL MEDICINE

## 2021-11-17 PROCEDURE — 99214 OFFICE O/P EST MOD 30 MIN: CPT | Performed by: INTERNAL MEDICINE

## 2021-11-17 RX ORDER — BUPRENORPHINE AND NALOXONE 8; 2 MG/1; MG/1
1 FILM, SOLUBLE BUCCAL; SUBLINGUAL 2 TIMES DAILY
Qty: 14 FILM | Refills: 0 | Status: SHIPPED | OUTPATIENT
Start: 2021-11-17 | End: 2021-11-24 | Stop reason: SDUPTHER

## 2021-11-17 NOTE — PROGRESS NOTES
Verbal order per Dr. Halima Calderon for urine drug screen. Positive for BUP BZO. Verified results with Yessenia Hurt. Dr. Halima Calderon ordered Suboxone 8mg film BID  for patient. Verified dose with patient. Patient was sent home with 1 week script of Suboxone 8mg film BID and will be seen back in the office 11/24/21.

## 2021-11-17 NOTE — PROGRESS NOTES
MEDICATION ASSISTED TREATMENT ENCOUNTER    HISTORY OF PRESENT ILLNESS  Patient presents for evaluation of opioid use and would like to be placed on medication assisted treatment  I saw him here for the first time   (March 2020), last time 10/27  He started a new job at [employer]   Prior to a recent visit he was thrown in skilled nursing because he dropped prior to going to court and he had Suboxone  Even though he is prescribed this they put him in skilled nursing for 2 days  He does admit to using meth 8 weeks ago  He missed his last appointment because he went to skilled nursing the day before his appointment  This was from a prior burglary charge  He was there for 23 days  Got out last Thursday night saw me the next day  Patient said they did not give him Suboxone  Put him back on Suboxone on 7/23  Denies any relapses  He got fired from [employer] when he went to skilled nursing  He is back building grain bins      His mother was murdered 2 years ago by someone injecting her with drugs   The perpetrator was recently found guilty  Pertinent drug history  He man referred here by his bogdan who is a patient of mine  Patient has had problems using pain pills, heroin  Patient started using pain pills7 years ago  A doctor initially prescribed them for a back injury  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 [place]   Apparently in the room the nurse tried to arouse him he was sweaty incoherent  She thought she was going to have to use Narcan  When I came and he was alert but somewhat confused  He denies using opiates  He said he got treatment in lieu instead of incarceration  He has to go to meetings counseling  Past Medical History:   Diagnosis Date    Anxiety     Back pain    Social history  He is working at [employer]  No girlfriend  He has a 3year-old  ROS   Patient is feeling well  Patient is not experiencing  withdrawal symptoms ,no urges or cravings  Patient is not having any side effects from the buprenorphine      PHYSICAL EXAM     Blood pressure (!) 184/88, pulse 97, temperature 97.6 °F (36.4 °C), height 5' 10\" (1.778 m), weight 233 lb (105.7 kg). Mental status examination    Cognition: oriented to person place and time      Appearance: Patient is in no acute distress, normal appearance, patient does not appear intoxicated/    Memory: Normal    Behavior/motor: Normal    Mood: Good mood, upbeat    Affect: Mood congruent    Attitude toward examiner: Pleasant, respectful    Thought content no delusions hallucinations suicidal ideation    Insight: fair    Judgment: fair    Eyes: Pupils normal    Skin: No track marks noted ,no rashes noted    COWS score: N/A                  URINE DRUG SCREEN TODAY   Alcohol, Urine NEG    Amphetamine Screen, Urine NEG    Barbiturate Screen, Urine NEG    Benzodiazepine Screen, Urine POS    Buprenorphine Urine POS    Cocaine Metabolite Screen, Urine NEG    FENTANYL SCREEN, URINE NEG    Gabapentin Screen, Urine N/A    MDMA, Urine NEG    Methadone Screen, Urine NEG    Methamphetamine, Urine NEG    Opiate Scrn, Ur NEG    Oxycodone Screen, Ur NEG    PCP Screen, Urine NEG    Propoxyphene Screen, Urine N/A    Synthetic Cannabinoids (K2) Screen, Urine NEG    THC Screen, Urine NEG    Tramadol Scrn, Ur NEG    Tricyclic Antidepressants, Urine N/A         Diagnosis Orders   1. Severe opioid use disorder (HCC)  buprenorphine-naloxone (SUBOXONE) 8-2 MG FILM SL film    POCT Rapid Drug Screen   2. Encounter for monitoring Suboxone maintenance therapy     3. Polysubstance abuse ([facility] [address]     4.  Abnormal urine finding           PLAN:  Urine has no methamphetamines  He does have benzos he denies using them  He was recently in the ER up in [facility] he got some woodchips in his eye  He thinks he might given a benzo there  They gave him 4 Vicodin to go home with he got rid of them did not take them  He is starting two classes a week at [employer]       He says if he relapses he will go to detention  Continue Suboxone 8 mg twice daily for opioid use disorder  Follow-up 1 week  I reviewed the [place] Automated Rx Reporting System report     There does not appear to be any discrepancies or overprescribing of controlled substances  It does show that he was given 4 Vicodin's from [facility]  I will get the records to see if he was given a benzodiazepine  I discussed a new phone aniya dealing with substance use disorder with the patient  It is called reSet  It is a 24/7 hour system that the patient can use at any time  There are lesson plans, tracking of treatment, rewards, etc  I told the patient's I will also have access to their account to track their progress  Patient will discuss with Chung Mckeon and sign up if interested

## 2021-11-24 ENCOUNTER — OFFICE VISIT (OUTPATIENT)
Dept: INTERNAL MEDICINE CLINIC | Age: 31
End: 2021-11-24
Payer: MEDICARE

## 2021-11-24 VITALS
HEART RATE: 84 BPM | HEIGHT: 70 IN | SYSTOLIC BLOOD PRESSURE: 121 MMHG | WEIGHT: 238 LBS | DIASTOLIC BLOOD PRESSURE: 84 MMHG | BODY MASS INDEX: 34.07 KG/M2 | TEMPERATURE: 96.6 F

## 2021-11-24 DIAGNOSIS — Z51.81 ENCOUNTER FOR MONITORING SUBOXONE MAINTENANCE THERAPY: ICD-10-CM

## 2021-11-24 DIAGNOSIS — F11.20 SEVERE OPIOID USE DISORDER (HCC): Primary | ICD-10-CM

## 2021-11-24 DIAGNOSIS — F19.10 POLYSUBSTANCE ABUSE (HCC): ICD-10-CM

## 2021-11-24 DIAGNOSIS — Z79.899 ENCOUNTER FOR MONITORING SUBOXONE MAINTENANCE THERAPY: ICD-10-CM

## 2021-11-24 PROCEDURE — 80305 DRUG TEST PRSMV DIR OPT OBS: CPT | Performed by: INTERNAL MEDICINE

## 2021-11-24 PROCEDURE — 4004F PT TOBACCO SCREEN RCVD TLK: CPT | Performed by: INTERNAL MEDICINE

## 2021-11-24 PROCEDURE — 99214 OFFICE O/P EST MOD 30 MIN: CPT | Performed by: INTERNAL MEDICINE

## 2021-11-24 PROCEDURE — G8428 CUR MEDS NOT DOCUMENT: HCPCS | Performed by: INTERNAL MEDICINE

## 2021-11-24 PROCEDURE — G8484 FLU IMMUNIZE NO ADMIN: HCPCS | Performed by: INTERNAL MEDICINE

## 2021-11-24 PROCEDURE — G8417 CALC BMI ABV UP PARAM F/U: HCPCS | Performed by: INTERNAL MEDICINE

## 2021-11-24 RX ORDER — BUPRENORPHINE AND NALOXONE 8; 2 MG/1; MG/1
1 FILM, SOLUBLE BUCCAL; SUBLINGUAL 2 TIMES DAILY
Qty: 42 FILM | Refills: 0 | Status: SHIPPED | OUTPATIENT
Start: 2021-11-24 | End: 2021-12-15

## 2021-11-24 NOTE — PROGRESS NOTES
MEDICATION ASSISTED TREATMENT ENCOUNTER    HISTORY OF PRESENT ILLNESS  Patient presents for evaluation of opioid use and would like to be placed on medication assisted treatment  I saw him here for the first time   (March 2020), last time 11/17  He had benzos in his urine at that visit  He said he had an accident at work where he got woodchips in his eye  He went to the emergency room in [facility]  He may have been given benzos there, we have been trying to get the records  He started a new job at [employer]   Prior to a recent visit he was thrown in [facility] because he dropped prior to going to court and he had Suboxone  Even though he is prescribed this they put him in [facility] for 2 days        His mother was murdered 2 years ago by someone injecting her with drugs   The perpetrator was recently found guilty  Pertinent drug history  He man referred here by his bogdan who is a patient of mine  Patient has had problems using pain pills, heroin  Patient started using pain pills7 years ago  A doctor initially prescribed them for a back injury  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 [place]   Apparently in the room the nurse tried to arouse him he was sweaty incoherent  She thought she was going to have to use Narcan  When I came and he was alert but somewhat confused  He denies using opiates  He said he got treatment in lieu instead of incarceration  He has to go to meetings counseling  Past Medical History:   Diagnosis Date    Anxiety     Back pain    Social history  He is working at [employer]  No girlfriend  He has a 3year-old  ROS   Patient is feeling well  Patient is not experiencing  withdrawal symptoms ,no urges or cravings  Patient is not having any side effects from the buprenorphine      PHYSICAL EXAM     Blood pressure (!) 184/88, pulse 97, temperature 97.6 °F (36.4 °C), height 5' 10\" (1.778 m), weight 233 lb (105.7 kg). Mental status examination    Cognition: oriented to person place and time      Appearance: Patient is in no acute distress, normal appearance, patient does not appear intoxicated/    Memory: Normal    Behavior/motor: Normal    Mood: Good mood, upbeat    Affect: Mood congruent    Attitude toward examiner: Pleasant, respectful    Thought content no delusions hallucinations suicidal ideation    Insight: fair    Judgment: fair    Eyes: Pupils normal    Skin: No track marks noted ,no rashes noted    COWS score: N/A                  URINE DRUG SCREEN TODAY   Amphetamine Screen, Urine NEG    Barbiturate Screen, Urine NEG    Benzodiazepine Screen, Urine NEG    Buprenorphine Urine POS    Cocaine Metabolite Screen, Urine NEG    FENTANYL SCREEN, URINE NEG    Gabapentin Screen, Urine N/A    MDMA, Urine NEG    Methadone Screen, Urine NEG    Methamphetamine, Urine NEG    Opiate Scrn, Ur NEG    Oxycodone Screen, Ur NEG    PCP Screen, Urine NEG    Propoxyphene Screen, Urine N/A    Synthetic Cannabinoids (K2) Screen, Urine NEG    THC Screen, Urine NEG    Tramadol Scrn, Ur NEG    Tricyclic Antidepressants, Urine N/A         Diagnosis Orders   1. Severe opioid use disorder (HCC)  POCT Rapid Drug Screen    buprenorphine-naloxone (SUBOXONE) 8-2 MG FILM SL film    Hepatitis C Antibody   2. Encounter for monitoring Suboxone maintenance therapy     3.  Polysubstance abuse ([facility] [address]           PLAN:  Urine has no benzos today  I think he got a benzo in the ER when he had woodchips in his eye  They gave him 4 Vicodin to go home with he threw them away  [facility] is [facility] [employer]       He says if he relapses he will go to snf  Continue Suboxone 8 mg twice daily for opioid use disorder  Follow-up 1 week  I reviewed the [place] Automated Rx Reporting System report     There does not appear to be any discrepancies or overprescribing of controlled substances  Follow-up 3 weeks

## 2021-11-24 NOTE — PROGRESS NOTES
Verbal order per Dr. Florencio Lynch for urine drug screen. Positive for BUP. Verified results with Leif Perez. Dr. Florencio Lynch ordered Suboxone 8mg film BID  for patient. Verified dose with patient. Patient was sent home with 3 week script of Suboxone 8mg film BID and will be seen back in the office 12/15/21.

## 2021-12-15 ENCOUNTER — TELEPHONE (OUTPATIENT)
Dept: INTERNAL MEDICINE CLINIC | Age: 31
End: 2021-12-15

## 2021-12-15 NOTE — TELEPHONE ENCOUNTER
Pt got transferred to me from [facility] ([facility])-     PT wanted to reschedule appt from 12/15/21 @ 12:45PM to 12/16/21 @ 8:00AM.    Rescheduled pt 12/16/21 @ 8:00AM.

## 2021-12-16 ENCOUNTER — OFFICE VISIT (OUTPATIENT)
Dept: INTERNAL MEDICINE CLINIC | Age: 31
End: 2021-12-16
Payer: MEDICARE

## 2021-12-16 VITALS
BODY MASS INDEX: 32.93 KG/M2 | TEMPERATURE: 97.8 F | HEIGHT: 70 IN | SYSTOLIC BLOOD PRESSURE: 152 MMHG | DIASTOLIC BLOOD PRESSURE: 85 MMHG | WEIGHT: 230 LBS | HEART RATE: 87 BPM

## 2021-12-16 DIAGNOSIS — F11.20 SEVERE OPIOID USE DISORDER (HCC): Primary | ICD-10-CM

## 2021-12-16 DIAGNOSIS — Z79.899 ENCOUNTER FOR MONITORING SUBOXONE MAINTENANCE THERAPY: ICD-10-CM

## 2021-12-16 DIAGNOSIS — Z51.81 ENCOUNTER FOR MONITORING SUBOXONE MAINTENANCE THERAPY: ICD-10-CM

## 2021-12-16 DIAGNOSIS — F19.10 POLYSUBSTANCE ABUSE (HCC): ICD-10-CM

## 2021-12-16 PROCEDURE — G8428 CUR MEDS NOT DOCUMENT: HCPCS | Performed by: INTERNAL MEDICINE

## 2021-12-16 PROCEDURE — 80305 DRUG TEST PRSMV DIR OPT OBS: CPT | Performed by: INTERNAL MEDICINE

## 2021-12-16 PROCEDURE — 99214 OFFICE O/P EST MOD 30 MIN: CPT | Performed by: INTERNAL MEDICINE

## 2021-12-16 PROCEDURE — G8484 FLU IMMUNIZE NO ADMIN: HCPCS | Performed by: INTERNAL MEDICINE

## 2021-12-16 PROCEDURE — 4004F PT TOBACCO SCREEN RCVD TLK: CPT | Performed by: INTERNAL MEDICINE

## 2021-12-16 PROCEDURE — G8417 CALC BMI ABV UP PARAM F/U: HCPCS | Performed by: INTERNAL MEDICINE

## 2021-12-16 RX ORDER — BUPRENORPHINE AND NALOXONE 8; 2 MG/1; MG/1
1 FILM, SOLUBLE BUCCAL; SUBLINGUAL 2 TIMES DAILY
Qty: 42 FILM | Refills: 0 | Status: SHIPPED | OUTPATIENT
Start: 2021-12-16 | End: 2022-01-06 | Stop reason: SDUPTHER

## 2021-12-16 RX ORDER — BUPRENORPHINE AND NALOXONE 8; 2 MG/1; MG/1
1 FILM, SOLUBLE BUCCAL; SUBLINGUAL 2 TIMES DAILY
COMMUNITY
End: 2021-12-16 | Stop reason: SDUPTHER

## 2021-12-16 NOTE — PROGRESS NOTES
MEDICATION ASSISTED TREATMENT ENCOUNTER    HISTORY OF PRESENT ILLNESS  Patient presents for evaluation of opioid use and would like to be placed on medication assisted treatment  I saw him here for the first time   (March 2020), last time 11/24  He had benzos in his urine at a prior visit  He said he had an accident at work where he got woodchips in his eye  He went to the emergency room in [facility]  He may have been given benzos there, we have been trying to get the records  He started a new job at [employer]   Prior to a recent visit he was thrown in retirement because he dropped prior to going to court and he had Suboxone  Even though he is prescribed this they put him in retirement for 2 days        His mother was murdered 2 years ago by someone injecting her with drugs   The perpetrator was recently found guilty  Pertinent drug history  He man referred here by his bogdan who is a patient of mine  Patient has had problems using pain pills, heroin  Patient started using pain pills7 years ago  A doctor initially prescribed them for a back injury  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 [place]   Apparently in the room the nurse tried to arouse him he was sweaty incoherent  She thought she was going to have to use Narcan  When I came and he was alert but somewhat confused  He denies using opiates  He said he got treatment in lieu instead of incarceration  He has to go to meetings counseling  Past Medical History:   Diagnosis Date    Anxiety     Back pain    Social history  He is working at [employer]  No girlfriend  He has a 3year-old  ROS   Patient is feeling well  Patient is not experiencing  withdrawal symptoms ,no urges or cravings  Patient is not having any side effects from the buprenorphine      PHYSICAL EXAM     Blood pressure (!) 184/88, pulse 97, temperature 97.6 °F (36.4 °C), height 5' 10\" (1.778 m), weight 233 lb (105.7 kg). Mental status examination    Cognition: oriented to person place and time      Appearance: Patient is in no acute distress, normal appearance, patient does not appear intoxicated/    Memory: Normal    Behavior/motor: Normal    Mood: Good mood, upbeat    Affect: Mood congruent    Attitude toward examiner: Pleasant, respectful    Thought content no delusions hallucinations suicidal ideation    Insight: fair    Judgment: fair    Eyes: Pupils normal    Skin: No track marks noted ,no rashes noted    COWS score: N/A                  URINE DRUG SCREEN TODAY   Amphetamine Screen, Urine NEG    Barbiturate Screen, Urine NEG    Benzodiazepine Screen, Urine NEG    Buprenorphine Urine POS    Cocaine Metabolite Screen, Urine NEG    FENTANYL SCREEN, URINE NEG    Gabapentin Screen, Urine N/A    MDMA, Urine NEG    Methadone Screen, Urine NEG    Methamphetamine, Urine NEG    Opiate Scrn, Ur NEG    Oxycodone Screen, Ur NEG    PCP Screen, Urine NEG    Propoxyphene Screen, Urine N/A    Synthetic Cannabinoids (K2) Screen, Urine NEG    THC Screen, Urine NEG    Tramadol Scrn, Ur NEG    Tricyclic Antidepressants, Urine N/A         Diagnosis Orders   1. Severe opioid use disorder (HCC)  POCT Rapid Drug Screen    buprenorphine-naloxone (SUBOXONE) 8-2 MG FILM SL film   2. Encounter for monitoring Suboxone maintenance therapy     3.  Polysubstance abuse ([facility] [address]           PLAN:  Urine has no benzos today  I think he got a benzo in the ER when he had woodchips in his eye several visits ago  They gave him 4 Vicodin to go home with he threw them away  He is starting [facility] a [facility] [employer]       He says if he relapses he will go to skilled nursing  Continue Suboxone 8 mg twice daily for opioid use disorder  Follow-up 3 weeks  I reviewed the [place] Automated Rx Reporting System report     There does not appear to be any discrepancies or overprescribing of controlled substances    Patient signed up for the recovery aniya, Reset  I ordered hep C antibody, he has not gotten it done yet

## 2022-01-06 ENCOUNTER — OFFICE VISIT (OUTPATIENT)
Dept: INTERNAL MEDICINE CLINIC | Age: 32
End: 2022-01-06
Payer: MEDICARE

## 2022-01-06 ENCOUNTER — TELEPHONE (OUTPATIENT)
Dept: INTERNAL MEDICINE CLINIC | Age: 32
End: 2022-01-06

## 2022-01-06 VITALS
HEART RATE: 95 BPM | BODY MASS INDEX: 32.78 KG/M2 | DIASTOLIC BLOOD PRESSURE: 84 MMHG | TEMPERATURE: 98.2 F | HEIGHT: 70 IN | SYSTOLIC BLOOD PRESSURE: 171 MMHG | WEIGHT: 229 LBS

## 2022-01-06 DIAGNOSIS — F19.10 POLYSUBSTANCE ABUSE (HCC): ICD-10-CM

## 2022-01-06 DIAGNOSIS — Z51.81 ENCOUNTER FOR MONITORING SUBOXONE MAINTENANCE THERAPY: ICD-10-CM

## 2022-01-06 DIAGNOSIS — Z79.899 ENCOUNTER FOR MONITORING SUBOXONE MAINTENANCE THERAPY: ICD-10-CM

## 2022-01-06 DIAGNOSIS — F11.20 SEVERE OPIOID USE DISORDER (HCC): Primary | ICD-10-CM

## 2022-01-06 PROCEDURE — 99214 OFFICE O/P EST MOD 30 MIN: CPT | Performed by: INTERNAL MEDICINE

## 2022-01-06 PROCEDURE — 80305 DRUG TEST PRSMV DIR OPT OBS: CPT | Performed by: INTERNAL MEDICINE

## 2022-01-06 PROCEDURE — G8428 CUR MEDS NOT DOCUMENT: HCPCS | Performed by: INTERNAL MEDICINE

## 2022-01-06 PROCEDURE — G8417 CALC BMI ABV UP PARAM F/U: HCPCS | Performed by: INTERNAL MEDICINE

## 2022-01-06 PROCEDURE — G8484 FLU IMMUNIZE NO ADMIN: HCPCS | Performed by: INTERNAL MEDICINE

## 2022-01-06 PROCEDURE — 4004F PT TOBACCO SCREEN RCVD TLK: CPT | Performed by: INTERNAL MEDICINE

## 2022-01-06 RX ORDER — BUPRENORPHINE AND NALOXONE 8; 2 MG/1; MG/1
1 FILM, SOLUBLE BUCCAL; SUBLINGUAL 2 TIMES DAILY
Qty: 42 FILM | Refills: 0 | Status: SHIPPED | OUTPATIENT
Start: 2022-01-06 | End: 2022-01-27 | Stop reason: SDUPTHER

## 2022-01-06 RX ORDER — BUPRENORPHINE AND NALOXONE 8; 2 MG/1; MG/1
1 FILM, SOLUBLE BUCCAL; SUBLINGUAL 2 TIMES DAILY
Qty: 42 FILM | Refills: 0 | Status: SHIPPED | OUTPATIENT
Start: 2022-01-06 | End: 2022-01-06 | Stop reason: CLARIF

## 2022-01-06 NOTE — PROGRESS NOTES
MEDICATION ASSISTED TREATMENT ENCOUNTER    HISTORY OF PRESENT ILLNESS  Patient presents for evaluation of opioid use and would like to be placed on medication assisted treatment  I saw him here for the first time   (March 2020), last time 12/16  He had benzos in his urine at a prior visit  He said he had an accident at work where he got woodchips in his eye  He went to the emergency room in [facility]  He may have been given benzos there, we have been trying to get the records  He started a new job at [employer]   Prior to a recent visit he was thrown in group home because he dropped prior to going to court and he had Suboxone  Even though he is prescribed this they put him in group home for 2 days        His mother was murdered 2 years ago by someone injecting her with drugs   The perpetrator was recently found guilty  Pertinent drug history  He man referred here by his bogdan who is a patient of mine  Patient has had problems using pain pills, heroin  Patient started using pain pills7 years ago  A doctor initially prescribed them for a back injury  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 [place]   Apparently in the room the nurse tried to arouse him he was sweaty incoherent  She thought she was going to have to use Narcan  When I came and he was alert but somewhat confused  He denies using opiates  He said he got treatment in lieu instead of incarceration  He has to go to meetings counseling  Past Medical History:   Diagnosis Date    Anxiety     Back pain    Social history  He is working at [employer]  No girlfriend  He has a 3year-old  ROS   Patient is feeling well  Patient is not experiencing  withdrawal symptoms ,no urges or cravings  Patient is not having any side effects from the buprenorphine      PHYSICAL EXAM     Blood pressure (!) 184/88, pulse 97, temperature 97.6 °F (36.4 °C), height 5' 10\" (1.778 m), weight 233 lb (105.7 kg). Mental status examination    Cognition: oriented to person place and time      Appearance: Patient is in no acute distress, normal appearance, patient does not appear intoxicated/    Memory: Normal    Behavior/motor: Normal    Mood: Good mood, upbeat    Affect: Mood congruent    Attitude toward examiner: Pleasant, respectful    Thought content no delusions hallucinations suicidal ideation    Insight: fair    Judgment: fair    Eyes: Pupils normal    Skin: No track marks noted ,no rashes noted    COWS score: N/A                  URINE DRUG SCREEN TODAY   Amphetamine Screen, Urine NEG    Barbiturate Screen, Urine NEG    Benzodiazepine Screen, Urine NEG    Buprenorphine Urine POS    Cocaine Metabolite Screen, Urine NEG    FENTANYL SCREEN, URINE NEG    Gabapentin Screen, Urine N/A    MDMA, Urine NEG    Methadone Screen, Urine NEG    Methamphetamine, Urine NEG    Opiate Scrn, Ur NEG    Oxycodone Screen, Ur NEG    PCP Screen, Urine NEG    Propoxyphene Screen, Urine N/A    Synthetic Cannabinoids (K2) Screen, Urine NEG    THC Screen, Urine NEG    Tramadol Scrn, Ur NEG    Tricyclic Antidepressants, Urine N/A         Diagnosis Orders   1. Severe opioid use disorder (HCC)  POCT Rapid Drug Screen    buprenorphine-naloxone (SUBOXONE) 8-2 MG FILM SL film    DISCONTINUED: buprenorphine-naloxone (SUBOXONE) 8-2 MG FILM SL film   2. Encounter for monitoring Suboxone maintenance therapy     3.  Polysubstance abuse ([facility] [address]           PLAN:  Urine has no benzos today  I think he got a benzo in the ER when he had woodchips in his eye several visits ago  They gave him 4 Vicodin to go home with he threw them away  [facility] is [facility] a [facility] [employer]       He says if he relapses he will go to senior care  Continue Suboxone 8 mg twice daily for opioid use disorder  Follow-up 3 weeks  I reviewed the [place] Automated Rx Reporting System report     There does not appear to be any discrepancies or overprescribing of controlled substances    Patient signed up for the recovery aniya, Reset  I ordered hep C antibody, he has not gotten it done yet

## 2022-01-06 NOTE — TELEPHONE ENCOUNTER
LPN called and left VM to cancel script of of Suboxone 8mg film BID for 21 days qty 42 at [facility] [address] in [address] [place] [address] per patient request.
Clothing

## 2022-01-06 NOTE — PROGRESS NOTES
Verbal order per Dr. Carroll Davis for urine drug screen. Positive for BUP THC. Verified results with Leyda Peres. Dr. Carroll Davis ordered Suboxone 8mg film BID for patient. Verified dose with patient. Patient was sent home with 3 week script of Suboxone 8mg film BID and will be seen back in the office 1/27/22.

## 2022-01-27 ENCOUNTER — OFFICE VISIT (OUTPATIENT)
Dept: INTERNAL MEDICINE CLINIC | Age: 32
End: 2022-01-27
Payer: MEDICARE

## 2022-01-27 VITALS
DIASTOLIC BLOOD PRESSURE: 95 MMHG | RESPIRATION RATE: 20 BRPM | HEIGHT: 70 IN | WEIGHT: 239.6 LBS | HEART RATE: 86 BPM | TEMPERATURE: 97.9 F | BODY MASS INDEX: 34.3 KG/M2 | SYSTOLIC BLOOD PRESSURE: 169 MMHG

## 2022-01-27 DIAGNOSIS — F19.10 POLYSUBSTANCE ABUSE (HCC): ICD-10-CM

## 2022-01-27 DIAGNOSIS — Z51.81 ENCOUNTER FOR MONITORING SUBOXONE MAINTENANCE THERAPY: ICD-10-CM

## 2022-01-27 DIAGNOSIS — F11.20 SEVERE OPIOID USE DISORDER (HCC): Primary | ICD-10-CM

## 2022-01-27 DIAGNOSIS — Z79.899 ENCOUNTER FOR MONITORING SUBOXONE MAINTENANCE THERAPY: ICD-10-CM

## 2022-01-27 PROCEDURE — G8417 CALC BMI ABV UP PARAM F/U: HCPCS | Performed by: INTERNAL MEDICINE

## 2022-01-27 PROCEDURE — 99214 OFFICE O/P EST MOD 30 MIN: CPT | Performed by: INTERNAL MEDICINE

## 2022-01-27 PROCEDURE — G8427 DOCREV CUR MEDS BY ELIG CLIN: HCPCS | Performed by: INTERNAL MEDICINE

## 2022-01-27 PROCEDURE — 80305 DRUG TEST PRSMV DIR OPT OBS: CPT | Performed by: INTERNAL MEDICINE

## 2022-01-27 PROCEDURE — 4004F PT TOBACCO SCREEN RCVD TLK: CPT | Performed by: INTERNAL MEDICINE

## 2022-01-27 PROCEDURE — G8484 FLU IMMUNIZE NO ADMIN: HCPCS | Performed by: INTERNAL MEDICINE

## 2022-01-27 RX ORDER — BUPRENORPHINE AND NALOXONE 8; 2 MG/1; MG/1
1 FILM, SOLUBLE BUCCAL; SUBLINGUAL 2 TIMES DAILY
Qty: 56 FILM | Refills: 0 | Status: SHIPPED | OUTPATIENT
Start: 2022-01-27 | End: 2022-02-24 | Stop reason: SDUPTHER

## 2022-01-27 NOTE — PROGRESS NOTES
Verbal order per Dr. Amalia Velasco for urine drug screen. Positive for BUP. Verified results with Damon Cardona RN. Dr. Amalia Vealsco ordered Suboxone 8mg film BID for patient. Verified dose with patient. Patient was sent home with 4 week script of Suboxone 8mg film BID and will be seen back in the office 2/24/22.

## 2022-01-27 NOTE — PROGRESS NOTES
MEDICATION ASSISTED TREATMENT ENCOUNTER    HISTORY OF PRESENT ILLNESS  Patient presents for evaluation of opioid use and would like to be placed on medication assisted treatment  I saw him here for the first time   (March 2020), last time 1/6  He had benzos in his urine at a prior visit  He said he had an accident at work where he got woodchips in his eye  He went to the emergency room in [facility]  He may have been given benzos there, we have been trying to get the records  He started a new job at [employer]   Prior to a recent visit he was thrown in nursing home because he dropped prior to going to court and he had Suboxone  Even though he is prescribed this they put him in nursing home for 2 days        His mother was murdered 2 years ago by someone injecting her with drugs   The perpetrator was recently found guilty  Pertinent drug history  He man referred here by his bogdan who is a patient of mine  Patient has had problems using pain pills, heroin  Patient started using pain pills7 years ago  A doctor initially prescribed them for a back injury  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 [place]   Apparently in the room the nurse tried to arouse him he was sweaty incoherent  She thought she was going to have to use Narcan  When I came and he was alert but somewhat confused  He denies using opiates  He said he got treatment in lieu instead of incarceration  He has to go to meetings counseling  Past Medical History:   Diagnosis Date    Anxiety     Back pain    Social history  He is working at [employer]  No girlfriend  He has a 3year-old  ROS   Patient is feeling well  Patient is not experiencing  withdrawal symptoms ,no urges or cravings  Patient is not having any side effects from the buprenorphine      PHYSICAL EXAM     Blood pressure (!) 184/88, pulse 97, temperature 97.6 °F (36.4 °C), height 5' 10\" (1.778 m), weight 233 lb (105.7 kg). Mental status examination    Cognition: oriented to person place and time      Appearance: Patient is in no acute distress, normal appearance, patient does not appear intoxicated/    Memory: Normal    Behavior/motor: Normal    Mood: Good mood, upbeat    Affect: Mood congruent    Attitude toward examiner: Pleasant, respectful    Thought content no delusions hallucinations suicidal ideation    Insight: fair    Judgment: fair    Eyes: Pupils normal    Skin: No track marks noted ,no rashes noted    COWS score: N/A                  URINE DRUG SCREEN TODAY   Amphetamine Screen, Urine NEG    Barbiturate Screen, Urine NEG    Benzodiazepine Screen, Urine NEG    Buprenorphine Urine POS    Cocaine Metabolite Screen, Urine NEG    FENTANYL SCREEN, URINE NEG    Gabapentin Screen, Urine N/A    MDMA, Urine NEG    Methadone Screen, Urine NEG    Methamphetamine, Urine NEG    Opiate Scrn, Ur NEG    Oxycodone Screen, Ur NEG    PCP Screen, Urine NEG    Propoxyphene Screen, Urine N/A    Synthetic Cannabinoids (K2) Screen, Urine NEG    THC Screen, Urine NEG    Tramadol Scrn, Ur NEG    Tricyclic Antidepressants, Urine N/A         Diagnosis Orders   1. Severe opioid use disorder (HCC)  POCT Rapid Drug Screen    buprenorphine-naloxone (SUBOXONE) 8-2 MG FILM SL film   2. Encounter for monitoring Suboxone maintenance therapy     3.  Polysubstance abuse ([facility] [address]           PLAN:  Urine has no benzos today  I think he got a benzo in the ER when he had woodchips in his eye several visits ago  They gave him 4 Vicodin to go home with he threw them away  [facility] is [facility]       He says if he relapses he will go to care home  Continue Suboxone 8 mg twice daily for opioid use disorder  Follow-up 4 weeks  I reviewed the [place] Automated Rx Reporting System report     There does not appear to be any discrepancies or overprescribing of controlled substances    Patient signed up for the recovery aniya, Reset  I ordered hep C antibody, he has not gotten it done yet

## 2022-02-24 ENCOUNTER — OFFICE VISIT (OUTPATIENT)
Dept: INTERNAL MEDICINE CLINIC | Age: 32
End: 2022-02-24
Payer: MEDICARE

## 2022-02-24 VITALS
SYSTOLIC BLOOD PRESSURE: 158 MMHG | WEIGHT: 226.4 LBS | RESPIRATION RATE: 20 BRPM | TEMPERATURE: 97.2 F | DIASTOLIC BLOOD PRESSURE: 91 MMHG | HEIGHT: 70 IN | HEART RATE: 105 BPM | BODY MASS INDEX: 32.41 KG/M2

## 2022-02-24 DIAGNOSIS — F19.10 POLYSUBSTANCE ABUSE (HCC): ICD-10-CM

## 2022-02-24 DIAGNOSIS — Z79.899 ENCOUNTER FOR MONITORING SUBOXONE MAINTENANCE THERAPY: ICD-10-CM

## 2022-02-24 DIAGNOSIS — F11.20 SEVERE OPIOID USE DISORDER (HCC): Primary | ICD-10-CM

## 2022-02-24 DIAGNOSIS — Z51.81 ENCOUNTER FOR MONITORING SUBOXONE MAINTENANCE THERAPY: ICD-10-CM

## 2022-02-24 PROCEDURE — G8417 CALC BMI ABV UP PARAM F/U: HCPCS | Performed by: INTERNAL MEDICINE

## 2022-02-24 PROCEDURE — 80305 DRUG TEST PRSMV DIR OPT OBS: CPT | Performed by: INTERNAL MEDICINE

## 2022-02-24 PROCEDURE — 99214 OFFICE O/P EST MOD 30 MIN: CPT | Performed by: INTERNAL MEDICINE

## 2022-02-24 PROCEDURE — G8427 DOCREV CUR MEDS BY ELIG CLIN: HCPCS | Performed by: INTERNAL MEDICINE

## 2022-02-24 PROCEDURE — G8484 FLU IMMUNIZE NO ADMIN: HCPCS | Performed by: INTERNAL MEDICINE

## 2022-02-24 PROCEDURE — 4004F PT TOBACCO SCREEN RCVD TLK: CPT | Performed by: INTERNAL MEDICINE

## 2022-02-24 RX ORDER — BUPRENORPHINE AND NALOXONE 8; 2 MG/1; MG/1
1 FILM, SOLUBLE BUCCAL; SUBLINGUAL 2 TIMES DAILY
Qty: 56 FILM | Refills: 0 | Status: SHIPPED | OUTPATIENT
Start: 2022-02-24 | End: 2022-03-24 | Stop reason: SDUPTHER

## 2022-02-24 NOTE — PROGRESS NOTES
MEDICATION ASSISTED TREATMENT ENCOUNTER    HISTORY OF PRESENT ILLNESS  Patient presents for evaluation of opioid use and would like to be placed on medication assisted treatment  I saw him here for the first time   (March 2020), last time 1/6  He had benzos in his urine at a prior visit  He said he had an accident at work where he got woodchips in his eye  He went to the emergency room in [facility]  He may have been given benzos there, we have been trying to get the records  He started a new job at [employer]   Prior to a recent visit he was thrown in residential because he dropped prior to going to court and he had Suboxone  Even though he is prescribed this they put him in residential for 2 days        His mother was murdered 2 years ago by someone injecting her with drugs   The perpetrator was recently found guilty  Pertinent drug history  He man referred here by his bogdan who is a patient of mine  Patient has had problems using pain pills, heroin  Patient started using pain pills7 years ago  A doctor initially prescribed them for a back injury  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 [place]   Apparently in the room the nurse tried to arouse him he was sweaty incoherent  She thought she was going to have to use Narcan  When I came and he was alert but somewhat confused  He denies using opiates  He said he got treatment in lieu instead of incarceration  He has to go to meetings counseling  Past Medical History:   Diagnosis Date    Anxiety     Back pain    Social history  He is working at [employer]  No girlfriend  He has a 3year-old  ROS   Patient is feeling well  Patient is not experiencing  withdrawal symptoms ,no urges or cravings  Patient is not having any side effects from the buprenorphine      PHYSICAL EXAM     Blood pressure (!) 184/88, pulse 97, temperature 97.6 °F (36.4 °C), height 5' 10\" (1.778 m), weight 233 lb (105.7 kg). Mental status examination    Cognition: oriented to person place and time      Appearance: Patient is in no acute distress, normal appearance, patient does not appear intoxicated/    Memory: Normal    Behavior/motor: Normal    Mood: Good mood, upbeat    Affect: Mood congruent    Attitude toward examiner: Pleasant, respectful    Thought content no delusions hallucinations suicidal ideation    Insight: fair    Judgment: fair    Eyes: Pupils normal    Skin: No track marks noted ,no rashes noted    COWS score: N/A                  URINE DRUG SCREEN TODAY   Amphetamine Screen, Urine NEG    Barbiturate Screen, Urine NEG    Benzodiazepine Screen, Urine NEG    Buprenorphine Urine POS    Cocaine Metabolite Screen, Urine NEG    FENTANYL SCREEN, URINE NEG    Gabapentin Screen, Urine N/A    MDMA, Urine NEG    Methadone Screen, Urine NEG    Methamphetamine, Urine NEG    Opiate Scrn, Ur NEG    Oxycodone Screen, Ur NEG    PCP Screen, Urine NEG    Propoxyphene Screen, Urine N/A    Synthetic Cannabinoids (K2) Screen, Urine NEG    THC Screen, Urine NEG    Tramadol Scrn, Ur NEG    Tricyclic Antidepressants, Urine N/A         Diagnosis Orders   1. Severe opioid use disorder (HCC)  POCT Rapid Drug Screen    buprenorphine-naloxone (SUBOXONE) 8-2 MG FILM SL film    Hepatitis C Antibody   2. Encounter for monitoring Suboxone maintenance therapy     3.  Polysubstance abuse ([facility] [address]           PLAN:  Urine has no benzos today  I think he got a benzo in the ER when he had woodchips in his eye several visits ago  They gave him 4 Vicodin to go home with he threw them away  [facility] is [facility]       He says if he relapses he will go to penitentiary  Continue Suboxone 8 mg twice daily for opioid use disorder  Follow-up 4 weeks  I reviewed the [place] Automated Rx Reporting System report     There does not appear to be any discrepancies or overprescribing of controlled substances    Patient signed up for the recovery aniya, Reset  I ordered hep C antibody, he has not gotten it done yet

## 2022-03-24 ENCOUNTER — TELEMEDICINE (OUTPATIENT)
Dept: INTERNAL MEDICINE CLINIC | Age: 32
End: 2022-03-24
Payer: MEDICARE

## 2022-03-24 DIAGNOSIS — Z51.81 ENCOUNTER FOR MONITORING SUBOXONE MAINTENANCE THERAPY: ICD-10-CM

## 2022-03-24 DIAGNOSIS — F11.20 SEVERE OPIOID USE DISORDER (HCC): Primary | ICD-10-CM

## 2022-03-24 DIAGNOSIS — Z79.899 ENCOUNTER FOR MONITORING SUBOXONE MAINTENANCE THERAPY: ICD-10-CM

## 2022-03-24 DIAGNOSIS — F19.10 POLYSUBSTANCE ABUSE (HCC): ICD-10-CM

## 2022-03-24 PROCEDURE — G8484 FLU IMMUNIZE NO ADMIN: HCPCS | Performed by: INTERNAL MEDICINE

## 2022-03-24 PROCEDURE — G8428 CUR MEDS NOT DOCUMENT: HCPCS | Performed by: INTERNAL MEDICINE

## 2022-03-24 PROCEDURE — G8417 CALC BMI ABV UP PARAM F/U: HCPCS | Performed by: INTERNAL MEDICINE

## 2022-03-24 PROCEDURE — 4004F PT TOBACCO SCREEN RCVD TLK: CPT | Performed by: INTERNAL MEDICINE

## 2022-03-24 PROCEDURE — 99213 OFFICE O/P EST LOW 20 MIN: CPT | Performed by: INTERNAL MEDICINE

## 2022-03-24 RX ORDER — BUPRENORPHINE AND NALOXONE 8; 2 MG/1; MG/1
1 FILM, SOLUBLE BUCCAL; SUBLINGUAL 2 TIMES DAILY
Qty: 56 FILM | Refills: 0 | Status: SHIPPED | OUTPATIENT
Start: 2022-03-24 | End: 2022-04-22 | Stop reason: SDUPTHER

## 2022-03-24 NOTE — PROGRESS NOTES
12/30/2021    TELEHEALTH EVALUATION -- Audio/Visual (During WAZOR-07 public health emergency)  Patient was at home, I was in the office  There was no one else present during the interview  HPI:    Patient  has requested an audio/video evaluation for the following concern(s):  Opioid use disorder    I saw him here for the first time   (March 2020), last time 2/24  He had benzos in his urine at a prior visit  He said he had an accident at work where he got woodchips in his eye  He went to the emergency room in [facility]  He may have been given benzos there, we have been trying to get the records  He started a new job at [employer]   Prior to a recent visit he was thrown in correction because he dropped prior to going to court and he had Suboxone  Even though he is prescribed this they put him in correction for 2 days           His mother was murdered 2 years ago by someone injecting her with drugs   The perpetrator was recently found guilty  Pertinent drug history  He man referred here by his stepsister who is a patient of mine  Patient has had problems using pain pills, heroin  Patient started using pain pills7 years ago  A doctor initially prescribed them for a back injury  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 Authorizing Provider   buprenorphine-naloxone (SUBOXONE) 8-2 MG FILM SL film Place 1 Film under the tongue 2 times daily for 28 days.   Nay Pina MD   aspirin-acetaminophen-caffeine (EXCEDRIN MIGRAINE) [phone] MG per tablet Take 1 tablet by mouth every 6 hours as needed for Headaches  Historical Provider, MD   levothyroxine patient (or guardian if applicable) is aware that this is a billable service. Verbal consent to proceed has been obtained within the past 12 months. The visit was conducted pursuant to the emergency declaration under the [address] [place] [address] authority and the [employer] Act. Patient identification was verified, and a caregiver was present when appropriate. The patient was located in a state where the provider was credentialed to provide care. Total time spent on this encounter: Not billed by time    --Serena Beltran MD on 12/30/2021 at 9:18 AM    An electronic signature was used to authenticate this note.

## 2022-04-22 ENCOUNTER — TELEPHONE (OUTPATIENT)
Dept: INTERNAL MEDICINE CLINIC | Age: 32
End: 2022-04-22

## 2022-04-22 DIAGNOSIS — F11.20 SEVERE OPIOID USE DISORDER (HCC): ICD-10-CM

## 2022-04-22 RX ORDER — BUPRENORPHINE AND NALOXONE 8; 2 MG/1; MG/1
1 FILM, SOLUBLE BUCCAL; SUBLINGUAL 2 TIMES DAILY
Qty: 6 FILM | Refills: 0 | OUTPATIENT
Start: 2022-04-22 | End: 2022-04-25 | Stop reason: SDUPTHER

## 2022-04-25 ENCOUNTER — OFFICE VISIT (OUTPATIENT)
Dept: INTERNAL MEDICINE CLINIC | Age: 32
End: 2022-04-25
Payer: MEDICARE

## 2022-04-25 VITALS
DIASTOLIC BLOOD PRESSURE: 102 MMHG | HEART RATE: 100 BPM | HEIGHT: 70 IN | BODY MASS INDEX: 31.78 KG/M2 | WEIGHT: 222 LBS | TEMPERATURE: 97.1 F | SYSTOLIC BLOOD PRESSURE: 160 MMHG

## 2022-04-25 DIAGNOSIS — F11.20 SEVERE OPIOID USE DISORDER (HCC): Primary | ICD-10-CM

## 2022-04-25 DIAGNOSIS — Z79.899 ENCOUNTER FOR MONITORING SUBOXONE MAINTENANCE THERAPY: ICD-10-CM

## 2022-04-25 DIAGNOSIS — F19.10 POLYSUBSTANCE ABUSE (HCC): ICD-10-CM

## 2022-04-25 DIAGNOSIS — Z51.81 ENCOUNTER FOR MONITORING SUBOXONE MAINTENANCE THERAPY: ICD-10-CM

## 2022-04-25 PROCEDURE — 4004F PT TOBACCO SCREEN RCVD TLK: CPT | Performed by: INTERNAL MEDICINE

## 2022-04-25 PROCEDURE — G8417 CALC BMI ABV UP PARAM F/U: HCPCS | Performed by: INTERNAL MEDICINE

## 2022-04-25 PROCEDURE — G8428 CUR MEDS NOT DOCUMENT: HCPCS | Performed by: INTERNAL MEDICINE

## 2022-04-25 PROCEDURE — 99214 OFFICE O/P EST MOD 30 MIN: CPT | Performed by: INTERNAL MEDICINE

## 2022-04-25 PROCEDURE — 80305 DRUG TEST PRSMV DIR OPT OBS: CPT | Performed by: INTERNAL MEDICINE

## 2022-04-25 RX ORDER — BUPRENORPHINE AND NALOXONE 8; 2 MG/1; MG/1
1 FILM, SOLUBLE BUCCAL; SUBLINGUAL 2 TIMES DAILY
Qty: 56 FILM | Refills: 0 | Status: SHIPPED | OUTPATIENT
Start: 2022-04-25 | End: 2022-05-24 | Stop reason: SDUPTHER

## 2022-04-25 NOTE — PROGRESS NOTES
Verbal order per Dr. James Forrest for urine drug screen. Positive for BUP. Verified results with Brandy Belle LPN. Dr. James Forrest ordered Suboxone 8 mg film BID for patient. Verified dose with patient. Patient was sent home with a script for Suboxone 8 mg film BID for 28 days and will be seen back in the office 05/23/2022.

## 2022-04-25 NOTE — PROGRESS NOTES
r    I saw him here for the first time   (March 2020), last time 2/24  We did a virtual visit 3/24  He was supposed to be here last Thursday but he had to work overtime  Friday he said he was sick we called in Suboxone over the weekend for him  He had benzos in his urine at a prior visit  He said he had an accident at work where he got woodchips in his eye  He went to the emergency room in [facility]  He may have been given benzos there, we have been trying to get the records  He started a new job at [employer]   Prior to a recent visit he was thrown in senior care because he dropped prior to going to court and he had Suboxone  Even though he is prescribed this they put him in senior care for 2 days           His mother was murdered 2 years ago by someone injecting her with drugs   The perpetrator was recently found guilty  Pertinent drug history  He man referred here by his stepsister who is a patient of mine  Patient has had problems using pain pills, heroin  Patient started using pain pills7 years ago  A doctor initially prescribed them for a back injury  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 Authorizing Provider   buprenorphine-naloxone (SUBOXONE) 8-2 MG FILM SL film Place 1 Film under the tongue 2 times daily for 28 days.   Karina Yao MD   aspirin-acetaminophen-caffeine (EXCEDRIN MIGRAINE) [id number] MG per tablet Take 1 tablet by mouth every 6 hours as needed for Headaches  Historical Provider, MD   levothyroxine (SYNTHROID) 125 MCG tablet Take 1 tablet by mouth daily  Karina Yao MD   acetaminophen (TYLENOL) 500 MG tablet Take 1 tablet by mouth every 4 hours as needed for Pain or Fever  Bernard Moore APRN - CNP   levothyroxine (SYNTHROID) 125 MCG tablet Take 1 tablet by mouth Daily  Marichuy Negrete MD   albuterol sulfate  (90 Base) MCG/ACT inhaler Inhale 2 puffs into the lungs every 4 hours as needed for Wheezing or Shortness of Breath  Tuckermichele Jackson, APRN - CNP       Social History     Tobacco Use    Smoking status: Current Every Day Smoker     Packs/day: 0.50     Years: 22.00     Pack years: 11.00     Types: Cigarettes     Start date: 1996    Smokeless tobacco: Never Used    Tobacco comment: smoking juul   Vaping Use    Vaping Use: Never used   Substance Use Topics    Alcohol use: No    Drug use: No     Comment: history opiates        PHYSICAL EXAMINATION:  Blood pressure (!) 160/102, pulse 100, temperature 97.1 °F (36.2 °C), height 5' 10\" (1.778 m), weight 222 lb (100.7 kg).     Mental status examination    Cognition: oriented to person place and time      Appearance: Patient is in no acute distress, normal appearance, patient does not appear intoxicated/    Memory: Normal    Behavior/motor: Normal    Mood: Good mood, upbeat    Affect: Mood congruent    Attitude toward examiner: Pleasant, respectful    Thought content no delusions hallucinations suicidal ideation    Insight: fair    Judgment: faif    Eyes: Pupils normal    Skin: No track marks noted ,no rashes noted    COWS score: N/A     urine tox screen today    Component 4/25/22 1318   Alcohol, Urine NEG    Amphetamine Screen, Urine NEG    Barbiturate Screen, Urine NEG    Benzodiazepine Screen, Urine NEG    Buprenorphine Urine POS    Cocaine Metabolite Screen, Urine NEG    FENTANYL SCREEN, URINE NEG    Gabapentin Screen, Urine N/A    MDMA, Urine NEG    Methadone Screen, Urine NEG    Methamphetamine, Urine NEG    Opiate Scrn, Ur NEG    Oxycodone Screen, Ur NEG    PCP Screen, Urine N/A    Propoxyphene Screen, Urine NEG    Synthetic Cannabinoids (K2) Screen, Urine NEG THC Screen, Urine NEG    Tramadol Scrn, Ur NEG    Tricyclic Antidepressants, Urine N/A                          -      Diagnosis Orders   1. Severe opioid use disorder (HCC)  POCT Rapid Drug Screen    buprenorphine-naloxone (SUBOXONE) 8-2 MG FILM SL film    HIV Screen   2. Encounter for monitoring Suboxone maintenance therapy     3.  Polysubstance abuse ([facility] [address]         Patient is doing well  Continue Suboxone 8 mg twice daily for opioid use disorder  Follow-up 4 weeks  I reviewed the [place] Automated Rx Reporting System report     There does not appear to be any discrepancies or overprescribing of controlled substances    Check hep C antibody, HIV  I discussed a new phone aniya dealing with substance use disorder with the patient  It is called reSet  It is a 24/7 hour system that the patient can use at any time  There are lesson plans, tracking of treatment, rewards, etc  I told the patient's I will also have access to their account to track their progress  Patient will discuss with Peter Reyes and sign up if interested

## 2022-05-24 DIAGNOSIS — F11.20 SEVERE OPIOID USE DISORDER (HCC): ICD-10-CM

## 2022-05-24 RX ORDER — BUPRENORPHINE AND NALOXONE 8; 2 MG/1; MG/1
1 FILM, SOLUBLE BUCCAL; SUBLINGUAL 2 TIMES DAILY
Qty: 4 FILM | Refills: 0 | Status: SHIPPED | OUTPATIENT
Start: 2022-05-24 | End: 2022-05-26 | Stop reason: SDUPTHER

## 2022-05-26 ENCOUNTER — OFFICE VISIT (OUTPATIENT)
Dept: INTERNAL MEDICINE CLINIC | Age: 32
End: 2022-05-26
Payer: MEDICARE

## 2022-05-26 VITALS
WEIGHT: 223.2 LBS | RESPIRATION RATE: 18 BRPM | HEART RATE: 92 BPM | DIASTOLIC BLOOD PRESSURE: 98 MMHG | SYSTOLIC BLOOD PRESSURE: 161 MMHG | TEMPERATURE: 98.9 F | BODY MASS INDEX: 31.95 KG/M2 | HEIGHT: 70 IN

## 2022-05-26 DIAGNOSIS — Z79.899 ENCOUNTER FOR MONITORING SUBOXONE MAINTENANCE THERAPY: ICD-10-CM

## 2022-05-26 DIAGNOSIS — F11.20 SEVERE OPIOID USE DISORDER (HCC): Primary | ICD-10-CM

## 2022-05-26 DIAGNOSIS — Z51.81 ENCOUNTER FOR MONITORING SUBOXONE MAINTENANCE THERAPY: ICD-10-CM

## 2022-05-26 DIAGNOSIS — F19.10 POLYSUBSTANCE ABUSE (HCC): ICD-10-CM

## 2022-05-26 PROCEDURE — G8427 DOCREV CUR MEDS BY ELIG CLIN: HCPCS | Performed by: INTERNAL MEDICINE

## 2022-05-26 PROCEDURE — 99214 OFFICE O/P EST MOD 30 MIN: CPT | Performed by: INTERNAL MEDICINE

## 2022-05-26 PROCEDURE — 80305 DRUG TEST PRSMV DIR OPT OBS: CPT | Performed by: INTERNAL MEDICINE

## 2022-05-26 PROCEDURE — 4004F PT TOBACCO SCREEN RCVD TLK: CPT | Performed by: INTERNAL MEDICINE

## 2022-05-26 PROCEDURE — G8417 CALC BMI ABV UP PARAM F/U: HCPCS | Performed by: INTERNAL MEDICINE

## 2022-05-26 RX ORDER — BUPRENORPHINE AND NALOXONE 8; 2 MG/1; MG/1
1 FILM, SOLUBLE BUCCAL; SUBLINGUAL 2 TIMES DAILY
Qty: 14 FILM | Refills: 0 | Status: SHIPPED | OUTPATIENT
Start: 2022-05-26 | End: 2022-06-02

## 2022-05-26 NOTE — PROGRESS NOTES
r    I saw him here for the first time   (March 2020), last time 4/25  He was supposed to be here Monday and then Tuesday  He said he got pulled over on Tuesday without a 's license  We had called him in Suboxone to last until today               His mother was murdered 2 years ago by someone injecting her with drugs   The perpetrator was recently found guilty  Pertinent drug history  He man referred here by his stepsister who is a patient of mine  Patient has had problems using pain pills, heroin  Patient started using pain pills7 years ago  A doctor initially prescribed them for a back injury  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 Authorizing Provider   buprenorphine-naloxone (SUBOXONE) 8-2 MG FILM SL film Place 1 Film under the tongue 2 times daily for 28 days.   Maggie May MD   aspirin-acetaminophen-caffeine (EXCEDRIN MIGRAINE) [phone] MG per tablet Take 1 tablet by mouth every 6 hours as needed for Headaches  Historical Provider, MD   levothyroxine (SYNTHROID) 125 MCG tablet Take 1 tablet by mouth daily  Maggie May MD   acetaminophen (TYLENOL) 500 MG tablet Take 1 tablet by mouth every 4 hours as needed for Pain or Fever  NICHOLE Aguilar - CNP   levothyroxine (SYNTHROID) 125 MCG tablet Take 1 tablet by mouth Daily  Maggie May MD   albuterol sulfate  (90 Base) MCG/ACT inhaler Inhale 2 puffs into the lungs every 4 hours as needed for Wheezing or Shortness of Breath  NICHOLE Copeland - CNP       Social History     Tobacco Use    Smoking status: Current Every Day Smoker     Packs/day: 0.50 Years: 22.00     Pack years: 11.00     Types: Cigarettes     Start date: 1996    Smokeless tobacco: Never Used    Tobacco comment: smoking juul   Vaping Use    Vaping Use: Never used   Substance Use Topics    Alcohol use: No    Drug use: No     Comment: history opiates        PHYSICAL EXAMINATION:  Blood pressure (!) 161/98, pulse 92, temperature 98.9 °F (37.2 °C), temperature source Tympanic, resp. rate 18, height 5' 10\" (1.778 m), weight 223 lb 3.2 oz (101.2 kg). Mental status examination    Cognition: oriented to person place and time      Appearance: Patient is in no acute distress, normal appearance, patient does not appear intoxicated/    Memory: Normal    Behavior/motor: Normal    Mood: Good mood, upbeat    Affect: Mood congruent    Attitude toward examiner: Pleasant, respectful    Thought content no delusions hallucinations suicidal ideation    Insight: fair    Judgment: faif    Eyes: Pupils normal    Skin: No track marks noted ,no rashes noted    COWS score: N/A     urine tox screen today           Component 5/26/22 1016   Alcohol, Urine NEG    Amphetamine Screen, Urine NEG    Barbiturate Screen, Urine NEG    Benzodiazepine Screen, Urine NEG    Buprenorphine Urine POS    Cocaine Metabolite Screen, Urine NEG    FENTANYL SCREEN, URINE NEG    Gabapentin Screen, Urine N/A    MDMA, Urine NEG    Methadone Screen, Urine NEG    Methamphetamine, Urine POS    Opiate Scrn, Ur NEG    Oxycodone Screen, Ur NEG    PCP Screen, Urine NEG    Propoxyphene Screen, Urine N/A    Synthetic Cannabinoids (K2) Screen, Urine NEG    THC Screen, Urine NEG    Tramadol Scrn, Ur NEG    Tricyclic Antidepressants, Urine N/A             -      Diagnosis Orders   1. Severe opioid use disorder (HCC)  POCT Rapid Drug Screen    buprenorphine-naloxone (SUBOXONE) 8-2 MG FILM SL film   2. Encounter for monitoring Suboxone maintenance therapy     3.  Polysubstance abuse ([facility] [address]       Urine has methamphetamines that he initially denied using them  I told him I am going to put him in a room do a mouth swab and a comprehensive urine screen  I told him if meth is present he would be dismissed from the practice  5 minutes later he came into my office saying that he had done meth last Saturday  I told him I will give him 1 week worth of Suboxone  Next week we are going to do a mouth swab and comprehensive urine he needs to be meth free  He said he messed up it will not happen again    Continue Suboxone 8 mg twice daily for opioid use disorder  Follow-up 4 weeks  I reviewed the [place] Automated Rx Reporting System report     There does not appear to be any discrepancies or overprescribing of controlled substances    Check hep C antibody, HIV  I discussed a new phone aniya dealing with substance use disorder with the patient  It is called reSet  It is a 24/7 hour system that the patient can use at any time  There are lesson plans, tracking of treatment, rewards, etc  I told the patient's I will also have access to their account to track their progress  Patient will discuss with Jonathan Tee and sign up if interested

## 2022-05-26 NOTE — PROGRESS NOTES
Verbal order per Dr. Carolyne Case for urine drug screen. Positive for BUP METH. Verified results with Ghazal Apodaca RN. Dr. Carolyne Case ordered Suboxone 8mg film BID for patient. Verified dose with patient. Patient was sent home with 1 week script of Suboxone 8mg film BID and will be seen back in the office 6/2/22.